# Patient Record
Sex: MALE | Race: WHITE | NOT HISPANIC OR LATINO | Employment: FULL TIME | URBAN - METROPOLITAN AREA
[De-identification: names, ages, dates, MRNs, and addresses within clinical notes are randomized per-mention and may not be internally consistent; named-entity substitution may affect disease eponyms.]

---

## 2017-04-21 ENCOUNTER — HOSPITAL ENCOUNTER (OUTPATIENT)
Dept: FAMILY MEDICINE CLINIC | Facility: CLINIC | Age: 52
Discharge: HOME | End: 2017-04-21
Attending: NURSE PRACTITIONER | Admitting: NURSE PRACTITIONER

## 2019-06-19 RX ORDER — TRIAMTERENE AND HYDROCHLOROTHIAZIDE 37.5; 25 MG/1; MG/1
1 CAPSULE ORAL DAILY
COMMUNITY
Start: 2016-01-22 | End: 2019-09-06 | Stop reason: SDUPTHER

## 2019-06-19 RX ORDER — DEXTROAMPHETAMINE SACCHARATE, AMPHETAMINE ASPARTATE, DEXTROAMPHETAMINE SULFATE AND AMPHETAMINE SULFATE 3.125; 3.125; 3.125; 3.125 MG/1; MG/1; MG/1; MG/1
12.5 TABLET ORAL 2 TIMES DAILY
COMMUNITY
Start: 2015-07-24 | End: 2019-06-20 | Stop reason: SDUPTHER

## 2019-06-20 ENCOUNTER — OFFICE VISIT (OUTPATIENT)
Dept: FAMILY MEDICINE CLINIC | Facility: CLINIC | Age: 54
End: 2019-06-20

## 2019-06-20 VITALS
TEMPERATURE: 97.7 F | HEIGHT: 72 IN | HEART RATE: 74 BPM | WEIGHT: 234 LBS | BODY MASS INDEX: 31.69 KG/M2 | OXYGEN SATURATION: 95 % | DIASTOLIC BLOOD PRESSURE: 82 MMHG | SYSTOLIC BLOOD PRESSURE: 126 MMHG

## 2019-06-20 DIAGNOSIS — F17.200 TOBACCO DEPENDENCE SYNDROME: ICD-10-CM

## 2019-06-20 DIAGNOSIS — I10 ESSENTIAL HYPERTENSION: ICD-10-CM

## 2019-06-20 DIAGNOSIS — F98.8 ATTENTION DEFICIT DISORDER (ADD) WITHOUT HYPERACTIVITY: Primary | ICD-10-CM

## 2019-06-20 DIAGNOSIS — E78.2 MIXED HYPERLIPIDEMIA: ICD-10-CM

## 2019-06-20 DIAGNOSIS — D75.1 POLYCYTHEMIA: ICD-10-CM

## 2019-06-20 DIAGNOSIS — Z00.00 PREVENTATIVE HEALTH CARE: ICD-10-CM

## 2019-06-20 DIAGNOSIS — E66.9 OBESITY WITH BODY MASS INDEX 30 OR GREATER: ICD-10-CM

## 2019-06-20 DIAGNOSIS — Z12.5 ENCOUNTER FOR SCREENING FOR MALIGNANT NEOPLASM OF PROSTATE: ICD-10-CM

## 2019-06-20 PROBLEM — M25.512 SHOULDER PAIN, LEFT: Status: ACTIVE | Noted: 2017-04-21

## 2019-06-20 PROCEDURE — 99214 OFFICE O/P EST MOD 30 MIN: CPT | Performed by: NURSE PRACTITIONER

## 2019-06-20 RX ORDER — DEXTROAMPHETAMINE SACCHARATE, AMPHETAMINE ASPARTATE, DEXTROAMPHETAMINE SULFATE AND AMPHETAMINE SULFATE 3.125; 3.125; 3.125; 3.125 MG/1; MG/1; MG/1; MG/1
12.5 TABLET ORAL 2 TIMES DAILY
Qty: 60 TABLET | Refills: 0 | Status: SHIPPED | OUTPATIENT
Start: 2019-06-20 | End: 2019-08-06 | Stop reason: SDUPTHER

## 2019-06-20 NOTE — PATIENT INSTRUCTIONS
Fasting blood work today   schedule colonoscopy   monitor blood pressures and call the office   stop smoking

## 2019-06-20 NOTE — PROGRESS NOTES
Subjective   Kendell Mccall is a 53 y.o. male.     53-year-old obese white male smoker with history of ADHD, chronic shoulder and back pain, and hypertension who comes in today for 3-month follow-up visit and refill of Adderall.  Narcotic contract signed today.  Patient states she is doing very well with the Adderall and having no complications and he has no complaints today.  Blood pressure 126/82 heart rate 74 he denies any chest pain, dyspnea, tachycardia, dizziness, or edema  Weight is up 6 pounds up to 34  Patient has had an eye exam since her last visit but he never scheduled his colonoscopy      Blood work today  Refill Adderall         The following portions of the patient's history were reviewed and updated as appropriate: allergies, current medications, past family history, past medical history, past social history, past surgical history and problem list.    Review of Systems    Objective   Physical Exam   Constitutional: He is oriented to person, place, and time. He appears well-developed and well-nourished.   Cardiovascular: Normal rate and regular rhythm.   Pulmonary/Chest: Effort normal and breath sounds normal.   Abdominal: Soft. Bowel sounds are normal.   Musculoskeletal: Normal range of motion.   Neurological: He is alert and oriented to person, place, and time.   Skin: Skin is warm and dry.         Assessment/Plan   Kendell was seen today for adhd.    Diagnoses and all orders for this visit:    Attention deficit disorder (ADD) without hyperactivity    Obesity with body mass index 30 or greater    Tobacco dependence syndrome    Encounter for screening for malignant neoplasm of prostate    Mixed hyperlipidemia  -     Lipid Panel With LDL / HDL Ratio    Essential hypertension    Polycythemia  -     CBC (No Diff)    Preventative health care  -     Comprehensive Metabolic Panel  -     PSA Screen    Other orders  -     amphetamine-dextroamphetamine (ADDERALL) 12.5 MG tablet; Take 1 tablet by mouth 2 (Two)  Times a Day.

## 2019-06-21 LAB
ALBUMIN SERPL-MCNC: 4.5 G/DL (ref 3.5–5.5)
ALBUMIN/GLOB SERPL: 2 {RATIO} (ref 1.2–2.2)
ALP SERPL-CCNC: 97 IU/L (ref 39–117)
ALT SERPL-CCNC: 25 IU/L (ref 0–44)
AST SERPL-CCNC: 32 IU/L (ref 0–40)
BILIRUB SERPL-MCNC: 0.5 MG/DL (ref 0–1.2)
BUN SERPL-MCNC: 13 MG/DL (ref 6–24)
BUN/CREAT SERPL: 14 (ref 9–20)
CALCIUM SERPL-MCNC: 9.6 MG/DL (ref 8.7–10.2)
CHLORIDE SERPL-SCNC: 103 MMOL/L (ref 96–106)
CHOLEST SERPL-MCNC: 169 MG/DL (ref 100–199)
CO2 SERPL-SCNC: 23 MMOL/L (ref 20–29)
CREAT SERPL-MCNC: 0.96 MG/DL (ref 0.76–1.27)
ERYTHROCYTE [DISTWIDTH] IN BLOOD BY AUTOMATED COUNT: 13.1 % (ref 12.3–15.4)
GLOBULIN SER CALC-MCNC: 2.3 G/DL (ref 1.5–4.5)
GLUCOSE SERPL-MCNC: 99 MG/DL (ref 65–99)
HCT VFR BLD AUTO: 50.8 % (ref 37.5–51)
HDLC SERPL-MCNC: 36 MG/DL
HGB BLD-MCNC: 18.1 G/DL (ref 13–17.7)
LDLC SERPL CALC-MCNC: 108 MG/DL (ref 0–99)
LDLC/HDLC SERPL: 3 RATIO (ref 0–3.6)
MCH RBC QN AUTO: 31.5 PG (ref 26.6–33)
MCHC RBC AUTO-ENTMCNC: 35.6 G/DL (ref 31.5–35.7)
MCV RBC AUTO: 88 FL (ref 79–97)
PLATELET # BLD AUTO: 207 X10E3/UL (ref 150–450)
POTASSIUM SERPL-SCNC: 4.3 MMOL/L (ref 3.5–5.2)
PROT SERPL-MCNC: 6.8 G/DL (ref 6–8.5)
PSA SERPL-MCNC: 3.4 NG/ML (ref 0–4)
RBC # BLD AUTO: 5.75 X10E6/UL (ref 4.14–5.8)
SODIUM SERPL-SCNC: 142 MMOL/L (ref 134–144)
TRIGL SERPL-MCNC: 123 MG/DL (ref 0–149)
VLDLC SERPL CALC-MCNC: 25 MG/DL (ref 5–40)
WBC # BLD AUTO: 8.8 X10E3/UL (ref 3.4–10.8)

## 2019-06-24 ENCOUNTER — TELEPHONE (OUTPATIENT)
Dept: FAMILY MEDICINE CLINIC | Facility: CLINIC | Age: 54
End: 2019-06-24

## 2019-08-06 RX ORDER — DEXTROAMPHETAMINE SACCHARATE, AMPHETAMINE ASPARTATE, DEXTROAMPHETAMINE SULFATE AND AMPHETAMINE SULFATE 3.125; 3.125; 3.125; 3.125 MG/1; MG/1; MG/1; MG/1
12.5 TABLET ORAL 2 TIMES DAILY
Qty: 60 TABLET | Refills: 0 | Status: SHIPPED | OUTPATIENT
Start: 2019-08-06 | End: 2019-09-16 | Stop reason: SDUPTHER

## 2019-08-06 NOTE — TELEPHONE ENCOUNTER
TC from patient requesting refill of Adderall sent to [facility] on [facility]. He has 2 days left.

## 2019-09-06 ENCOUNTER — TELEPHONE (OUTPATIENT)
Dept: FAMILY MEDICINE CLINIC | Facility: CLINIC | Age: 54
End: 2019-09-06

## 2019-09-06 RX ORDER — TRIAMTERENE AND HYDROCHLOROTHIAZIDE 37.5; 25 MG/1; MG/1
1 CAPSULE ORAL DAILY
Qty: 90 CAPSULE | Refills: 1 | Status: SHIPPED | OUTPATIENT
Start: 2019-09-06 | End: 2020-02-25 | Stop reason: SDUPTHER

## 2019-09-06 NOTE — TELEPHONE ENCOUNTER
PATIENT NEEDING HIS ADDERRALL REFILLED PLEASE  SEND TO [facility] -ALSO NEEDS HIS BP MED SENT TO [facility] MAIL ORDER - HAS A WEEK LEFT ON BOTH

## 2019-09-16 RX ORDER — DEXTROAMPHETAMINE SACCHARATE, AMPHETAMINE ASPARTATE, DEXTROAMPHETAMINE SULFATE AND AMPHETAMINE SULFATE 3.125; 3.125; 3.125; 3.125 MG/1; MG/1; MG/1; MG/1
12.5 TABLET ORAL 2 TIMES DAILY
Qty: 60 TABLET | Refills: 0 | Status: SHIPPED | OUTPATIENT
Start: 2019-09-16 | End: 2019-09-16 | Stop reason: SDUPTHER

## 2019-09-16 RX ORDER — DEXTROAMPHETAMINE SACCHARATE, AMPHETAMINE ASPARTATE, DEXTROAMPHETAMINE SULFATE AND AMPHETAMINE SULFATE 3.125; 3.125; 3.125; 3.125 MG/1; MG/1; MG/1; MG/1
12.5 TABLET ORAL 2 TIMES DAILY
Qty: 60 TABLET | Refills: 0 | Status: SHIPPED | OUTPATIENT
Start: 2019-09-16 | End: 2019-11-12 | Stop reason: SDUPTHER

## 2019-09-16 NOTE — TELEPHONE ENCOUNTER
Pt has his 3 month appt scheduled for Thursday.  Wants to know if we can go ahead and send in his medication (Adderall).  SG

## 2019-09-19 ENCOUNTER — OFFICE VISIT (OUTPATIENT)
Dept: FAMILY MEDICINE CLINIC | Facility: CLINIC | Age: 54
End: 2019-09-19

## 2019-09-19 VITALS
DIASTOLIC BLOOD PRESSURE: 85 MMHG | BODY MASS INDEX: 31.64 KG/M2 | HEART RATE: 77 BPM | SYSTOLIC BLOOD PRESSURE: 156 MMHG | HEIGHT: 72 IN | TEMPERATURE: 97.6 F | OXYGEN SATURATION: 96 % | WEIGHT: 233.6 LBS

## 2019-09-19 DIAGNOSIS — F98.8 ATTENTION DEFICIT DISORDER (ADD) WITHOUT HYPERACTIVITY: Primary | ICD-10-CM

## 2019-09-19 DIAGNOSIS — F17.200 SMOKER: ICD-10-CM

## 2019-09-19 PROCEDURE — 99213 OFFICE O/P EST LOW 20 MIN: CPT | Performed by: NURSE PRACTITIONER

## 2019-09-19 NOTE — PROGRESS NOTES
Subjective   Kendell Mccall is a 53 y.o. male.     51-year-old obese white male smoker history ADHD, chronic lower back pain, and hypertension who comes in today for 3-month follow-up visit and refill of Adderall.  Patient denies any issues that he is feeling very well blood pressure has been good   blood pressure 156/80 heart rate 76 he denies any chest pain, dyspnea, tachycardia, dizziness edema   fasting blood work due 1st of the year         The following portions of the patient's history were reviewed and updated as appropriate: allergies, current medications, past family history, past medical history, past social history, past surgical history and problem list.    Review of Systems   Constitutional: Negative.    Respiratory: Negative.    Cardiovascular: Negative.    Gastrointestinal: Negative.    Genitourinary: Negative.    Musculoskeletal: Negative.    Skin: Negative.    Neurological: Negative.        Objective   Physical Exam   Constitutional: He is oriented to person, place, and time. He appears well-developed and well-nourished.   Cardiovascular: Normal rate and regular rhythm.   Pulmonary/Chest: Effort normal and breath sounds normal.   Abdominal: Soft. Bowel sounds are normal.   Neurological: He is alert and oriented to person, place, and time.   Skin: Skin is warm and dry.   Psychiatric: He has a normal mood and affect.         Assessment/Plan   Kendell was seen today for add.    Diagnoses and all orders for this visit:    Attention deficit disorder (ADD) without hyperactivity    Smoker

## 2019-11-12 RX ORDER — DEXTROAMPHETAMINE SACCHARATE, AMPHETAMINE ASPARTATE, DEXTROAMPHETAMINE SULFATE AND AMPHETAMINE SULFATE 3.125; 3.125; 3.125; 3.125 MG/1; MG/1; MG/1; MG/1
12.5 TABLET ORAL 2 TIMES DAILY
Qty: 60 TABLET | Refills: 0 | Status: SHIPPED | OUTPATIENT
Start: 2019-11-12 | End: 2019-12-18 | Stop reason: SDUPTHER

## 2019-12-18 DIAGNOSIS — F98.8 ATTENTION DEFICIT DISORDER (ADD) WITHOUT HYPERACTIVITY: Primary | ICD-10-CM

## 2019-12-18 NOTE — TELEPHONE ENCOUNTER
Pt needs a refill on his adderral- please send to [facility] in [place]- pt had an appt tomorrow has new job couldn't make it, will call back to reschedule

## 2019-12-18 NOTE — TELEPHONE ENCOUNTER
Pt is due for his 3 month appt.  Says he got a new job and couldn't make his appt that he had scheduled for tomorrow.  SG

## 2019-12-19 RX ORDER — DEXTROAMPHETAMINE SACCHARATE, AMPHETAMINE ASPARTATE, DEXTROAMPHETAMINE SULFATE AND AMPHETAMINE SULFATE 3.125; 3.125; 3.125; 3.125 MG/1; MG/1; MG/1; MG/1
12.5 TABLET ORAL 2 TIMES DAILY
Qty: 60 TABLET | Refills: 0 | Status: SHIPPED | OUTPATIENT
Start: 2019-12-19 | End: 2020-02-25 | Stop reason: SDUPTHER

## 2020-01-15 ENCOUNTER — OFFICE VISIT (OUTPATIENT)
Dept: FAMILY MEDICINE CLINIC | Facility: CLINIC | Age: 55
End: 2020-01-15

## 2020-01-15 VITALS
BODY MASS INDEX: 32.51 KG/M2 | WEIGHT: 240 LBS | OXYGEN SATURATION: 99 % | HEART RATE: 74 BPM | DIASTOLIC BLOOD PRESSURE: 83 MMHG | HEIGHT: 72 IN | SYSTOLIC BLOOD PRESSURE: 188 MMHG | TEMPERATURE: 97.7 F

## 2020-01-15 DIAGNOSIS — I10 ESSENTIAL HYPERTENSION: Primary | ICD-10-CM

## 2020-01-15 DIAGNOSIS — F98.8 ATTENTION DEFICIT DISORDER (ADD) WITHOUT HYPERACTIVITY: ICD-10-CM

## 2020-01-15 PROCEDURE — 99214 OFFICE O/P EST MOD 30 MIN: CPT | Performed by: NURSE PRACTITIONER

## 2020-01-15 NOTE — PATIENT INSTRUCTIONS
Fasting blood work next visit   diet exercise   monitor blood pressure and call the office   reconsider colonoscopy

## 2020-01-15 NOTE — PROGRESS NOTES
Subjective   Kendell Mccall is a 54 y.o. male.      53-year-old obese white male smoker with history ADHD Martinez chronic low back pain, and hypertension who comes in today for follow-up visit.  He states he is doing well with the ADHD medication  It helps him focus.  He has had no unusual neural side effects or chest pain.  Blood pressure elevated today 188/82 heart rate 74 with murmur per patient has not taken his blood pressure medication.  I instructed him to take his medications at home and check his blood pressures and call them to the office to make sure they are  Within normal limits.  His pressure at last visit on blood pressure medicine was 156/80  Weight is 240 which is up 7 lb   patient up-to-date on PSA and eye exam but still refuses colonoscopy     follow-up 3 months fasting for blood work   refill medications   reconsider colonoscopy       The following portions of the patient's history were reviewed and updated as appropriate: allergies, current medications, past family history, past medical history, past social history, past surgical history and problem list.    Review of Systems   Constitutional: Negative.    HENT: Negative.    Respiratory: Negative.    Cardiovascular: Negative.    Gastrointestinal: Negative.    Genitourinary: Negative.    Musculoskeletal: Negative.    Skin: Negative.    Neurological: Negative.    Psychiatric/Behavioral: Negative.        Objective   Physical Exam   Constitutional: He is oriented to person, place, and time. He appears well-developed and well-nourished.   Cardiovascular: Normal rate and regular rhythm.   Pulmonary/Chest: Effort normal.   Abdominal: Soft. Bowel sounds are normal.   Musculoskeletal: Normal range of motion.   Neurological: He is alert and oriented to person, place, and time.   Skin: Skin is warm and dry.   Psychiatric: He has a normal mood and affect.         Assessment/Plan   Kendell was seen today for add.    Diagnoses and all orders for this  visit:    Essential hypertension    Attention deficit disorder (ADD) without hyperactivity    BMI 32.0-32.9,adult

## 2020-02-25 DIAGNOSIS — F98.8 ATTENTION DEFICIT DISORDER (ADD) WITHOUT HYPERACTIVITY: ICD-10-CM

## 2020-02-25 RX ORDER — DEXTROAMPHETAMINE SACCHARATE, AMPHETAMINE ASPARTATE, DEXTROAMPHETAMINE SULFATE AND AMPHETAMINE SULFATE 3.125; 3.125; 3.125; 3.125 MG/1; MG/1; MG/1; MG/1
12.5 TABLET ORAL 2 TIMES DAILY
Qty: 60 TABLET | Refills: 0 | Status: SHIPPED | OUTPATIENT
Start: 2020-02-25 | End: 2020-05-11 | Stop reason: SDUPTHER

## 2020-02-25 RX ORDER — TRIAMTERENE AND HYDROCHLOROTHIAZIDE 37.5; 25 MG/1; MG/1
1 CAPSULE ORAL DAILY
Qty: 90 CAPSULE | Refills: 1 | Status: SHIPPED | OUTPATIENT
Start: 2020-02-25 | End: 2020-05-11 | Stop reason: SDUPTHER

## 2020-05-11 DIAGNOSIS — F98.8 ATTENTION DEFICIT DISORDER (ADD) WITHOUT HYPERACTIVITY: ICD-10-CM

## 2020-05-11 RX ORDER — DEXTROAMPHETAMINE SACCHARATE, AMPHETAMINE ASPARTATE, DEXTROAMPHETAMINE SULFATE AND AMPHETAMINE SULFATE 3.125; 3.125; 3.125; 3.125 MG/1; MG/1; MG/1; MG/1
12.5 TABLET ORAL 2 TIMES DAILY
Qty: 60 TABLET | Refills: 0 | Status: SHIPPED | OUTPATIENT
Start: 2020-05-11 | End: 2020-08-31 | Stop reason: SDUPTHER

## 2020-05-11 RX ORDER — TRIAMTERENE AND HYDROCHLOROTHIAZIDE 37.5; 25 MG/1; MG/1
1 CAPSULE ORAL DAILY
Qty: 90 CAPSULE | Refills: 1 | Status: SHIPPED | OUTPATIENT
Start: 2020-05-11 | End: 2020-08-31 | Stop reason: SDUPTHER

## 2020-05-11 NOTE — TELEPHONE ENCOUNTER
Date of last refill: Adderall 02/25/2020       Dyazide 02/25/2020  Is there an Inspect on file: please run    Last appt date  :9/19/2019       Next appt date: none      Additional information:

## 2020-08-17 DIAGNOSIS — F98.8 ATTENTION DEFICIT DISORDER (ADD) WITHOUT HYPERACTIVITY: ICD-10-CM

## 2020-08-17 RX ORDER — DEXTROAMPHETAMINE SACCHARATE, AMPHETAMINE ASPARTATE, DEXTROAMPHETAMINE SULFATE AND AMPHETAMINE SULFATE 3.125; 3.125; 3.125; 3.125 MG/1; MG/1; MG/1; MG/1
12.5 TABLET ORAL 2 TIMES DAILY
Qty: 60 TABLET | Refills: 0 | OUTPATIENT
Start: 2020-08-17

## 2020-08-31 ENCOUNTER — OFFICE VISIT (OUTPATIENT)
Dept: FAMILY MEDICINE CLINIC | Facility: CLINIC | Age: 55
End: 2020-08-31

## 2020-08-31 VITALS
OXYGEN SATURATION: 98 % | BODY MASS INDEX: 33.16 KG/M2 | TEMPERATURE: 97.3 F | WEIGHT: 244.8 LBS | HEART RATE: 72 BPM | SYSTOLIC BLOOD PRESSURE: 133 MMHG | DIASTOLIC BLOOD PRESSURE: 87 MMHG | RESPIRATION RATE: 18 BRPM | HEIGHT: 72 IN

## 2020-08-31 DIAGNOSIS — F17.200 TOBACCO DEPENDENCE SYNDROME: ICD-10-CM

## 2020-08-31 DIAGNOSIS — F98.8 ATTENTION DEFICIT DISORDER (ADD) WITHOUT HYPERACTIVITY: ICD-10-CM

## 2020-08-31 DIAGNOSIS — I10 ESSENTIAL HYPERTENSION: Primary | ICD-10-CM

## 2020-08-31 PROCEDURE — 99214 OFFICE O/P EST MOD 30 MIN: CPT | Performed by: NURSE PRACTITIONER

## 2020-08-31 RX ORDER — TRAZODONE HYDROCHLORIDE 50 MG/1
50 TABLET ORAL NIGHTLY
Qty: 60 TABLET | Refills: 1 | Status: SHIPPED | OUTPATIENT
Start: 2020-08-31 | End: 2020-10-24

## 2020-08-31 RX ORDER — TRIAMTERENE AND HYDROCHLOROTHIAZIDE 37.5; 25 MG/1; MG/1
1 CAPSULE ORAL DAILY
Qty: 90 CAPSULE | Refills: 1 | Status: SHIPPED | OUTPATIENT
Start: 2020-08-31 | End: 2021-03-19 | Stop reason: SDUPTHER

## 2020-08-31 RX ORDER — DEXTROAMPHETAMINE SACCHARATE, AMPHETAMINE ASPARTATE, DEXTROAMPHETAMINE SULFATE AND AMPHETAMINE SULFATE 3.125; 3.125; 3.125; 3.125 MG/1; MG/1; MG/1; MG/1
12.5 TABLET ORAL 2 TIMES DAILY
Qty: 60 TABLET | Refills: 0 | Status: SHIPPED | OUTPATIENT
Start: 2020-08-31 | End: 2020-11-05 | Stop reason: SDUPTHER

## 2020-08-31 NOTE — PROGRESS NOTES
"    Kendell Mccall is a 54 y.o. male.     54-year-old obese white male smoker with history ADHD, chronic low back pain and hypertension who comes in for follow-up visit today.  Patient states he started a new job and will have insurance next month.  I am going to check his kidney liver function today due to the ADHD medication and he will call and get the rest of his fasting blood work done  He is having trouble sleeping and I am placing him on some trazodone to help him sleep  Blood pressure little elevated 132/86 heart rate 72 he denies any chest pain, dyspnea, tachycardia or dizziness.  Patient is going to monitor blood pressure at home and let me know if it remains within normal limits or not  Weight is up 5 pounds at 245 and we discussed diet weight loss  Patient never got his colonoscopy schedule due to not having insurance    CMP today  Fasting blood work in near future  Trazodone 50 mg 1-3 nightly as needed  Diet and exercise as discussed  Monitor blood pressure at home and call office  Follow-up 6 months         The following portions of the patient's history were reviewed and updated as appropriate: allergies, current medications, past family history, past medical history, past social history, past surgical history and problem list.    Vitals:    08/31/20 1301   BP: 133/87   BP Location: Right arm   Patient Position: Sitting   Cuff Size: Large Adult   Pulse: 72   Resp: 18   Temp: 97.3 °F (36.3 °C)   TempSrc: Temporal   SpO2: 98%   Weight: 111 kg (244 lb 12.8 oz)   Height: 182.9 cm (72.01\")     Body mass index is 33.19 kg/m².    Past Medical History:   Diagnosis Date   • ADHD (attention deficit hyperactivity disorder)    • Hypertension      Past Surgical History:   Procedure Laterality Date   • EYE SURGERY     • HERNIA REPAIR       Family History   Problem Relation Age of Onset   • Heart disease Mother    • Diabetes Father    • Lung cancer Father        There is no immunization history on file for this " patient.    Office Visit on 06/20/2019   Component Date Value Ref Range Status   • WBC 06/20/2019 8.8  3.4 - 10.8 x10E3/uL Final   • RBC 06/20/2019 5.75  4.14 - 5.80 x10E6/uL Final   • Hemoglobin 06/20/2019 18.1* 13.0 - 17.7 g/dL Final   • Hematocrit 06/20/2019 50.8  37.5 - 51.0 % Final   • MCV 06/20/2019 88  79 - 97 fL Final   • MCH 06/20/2019 31.5  26.6 - 33.0 pg Final   • MCHC 06/20/2019 35.6  31.5 - 35.7 g/dL Final   • RDW 06/20/2019 13.1  12.3 - 15.4 % Final   • Platelets 06/20/2019 207  150 - 450 x10E3/uL Final   • Glucose 06/20/2019 99  65 - 99 mg/dL Final    Comment: Specimen received in contact with cells. No visible hemolysis  present. However GLUC may be decreased and K increased. Clinical  correlation indicated.     • BUN 06/20/2019 13  6 - 24 mg/dL Final   • Creatinine 06/20/2019 0.96  0.76 - 1.27 mg/dL Final   • eGFR Non  Am 06/20/2019 91  >59 mL/min/1.73 Final   • eGFR African Am 06/20/2019 105  >59 mL/min/1.73 Final   • BUN/Creatinine Ratio 06/20/2019 14  9 - 20 Final   • Sodium 06/20/2019 142  134 - 144 mmol/L Final   • Potassium 06/20/2019 4.3  3.5 - 5.2 mmol/L Final    Comment: Specimen received in contact with cells. No visible hemolysis  present. However GLUC may be decreased and K increased. Clinical  correlation indicated.     • Chloride 06/20/2019 103  96 - 106 mmol/L Final   • Total CO2 06/20/2019 23  20 - 29 mmol/L Final   • Calcium 06/20/2019 9.6  8.7 - 10.2 mg/dL Final   • Total Protein 06/20/2019 6.8  6.0 - 8.5 g/dL Final   • Albumin 06/20/2019 4.5  3.5 - 5.5 g/dL Final   • Globulin 06/20/2019 2.3  1.5 - 4.5 g/dL Final   • A/G Ratio 06/20/2019 2.0  1.2 - 2.2 Final   • Total Bilirubin 06/20/2019 0.5  0.0 - 1.2 mg/dL Final   • Alkaline Phosphatase 06/20/2019 97  39 - 117 IU/L Final   • AST (SGOT) 06/20/2019 32  0 - 40 IU/L Final   • ALT (SGPT) 06/20/2019 25  0 - 44 IU/L Final   • Total Cholesterol 06/20/2019 169  100 - 199 mg/dL Final   • Triglycerides 06/20/2019 123  0 - 149 mg/dL  Final   • HDL Cholesterol 06/20/2019 36* >39 mg/dL Final   • VLDL Cholesterol 06/20/2019 25  5 - 40 mg/dL Final   • LDL Cholesterol  06/20/2019 108* 0 - 99 mg/dL Final   • LDL/HDL Ratio 06/20/2019 3.0  0.0 - 3.6 ratio Final    Comment:                                     LDL/HDL Ratio                                              Men  Women                                1/2 Avg.Risk  1.0    1.5                                    Avg.Risk  3.6    3.2                                 2X Avg.Risk  6.2    5.0                                 3X Avg.Risk  8.0    6.1     • PSA 06/20/2019 3.4  0.0 - 4.0 ng/mL Final    Comment: Roche ECLIA methodology.  According to the American Urological Association, Serum PSA should  decrease and remain at undetectable levels after radical  prostatectomy. The AUA defines biochemical recurrence as an initial  PSA value 0.2 ng/mL or greater followed by a subsequent confirmatory  PSA value 0.2 ng/mL or greater.  Values obtained with different assay methods or kits cannot be used  interchangeably. Results cannot be interpreted as absolute evidence  of the presence or absence of malignant disease.           Review of Systems   Constitutional: Negative.    HENT: Negative.    Respiratory: Negative.    Cardiovascular: Negative.    Gastrointestinal: Negative.    Genitourinary: Negative.    Musculoskeletal: Negative.    Skin: Negative.    Neurological: Negative.    Psychiatric/Behavioral: Negative.        Objective   Physical Exam   Constitutional: He is oriented to person, place, and time. He appears well-developed and well-nourished.   Cardiovascular: Normal rate and regular rhythm.   Pulmonary/Chest: Effort normal and breath sounds normal.   Musculoskeletal: Normal range of motion.   Neurological: He is alert and oriented to person, place, and time.   Skin: Skin is warm and dry.   Psychiatric: He has a normal mood and affect.       Procedures    Assessment/Plan   Kendell was seen today for med  refill and insomnia.    Diagnoses and all orders for this visit:    Essential hypertension    Attention deficit disorder (ADD) without hyperactivity  -     amphetamine-dextroamphetamine (Adderall) 12.5 MG tablet; Take 1 tablet by mouth 2 (Two) Times a Day.  -     Comprehensive Metabolic Panel    Tobacco dependence syndrome    BMI 33.0-33.9,adult    Other orders  -     traZODone (DESYREL) 50 MG tablet; Take 1 tablet by mouth Every Night.  -     triamterene-hydrochlorothiazide (DYAZIDE) 37.5-25 MG per capsule; Take 1 capsule by mouth Daily.          Current Outpatient Medications:   •  amphetamine-dextroamphetamine (Adderall) 12.5 MG tablet, Take 1 tablet by mouth 2 (Two) Times a Day., Disp: 60 tablet, Rfl: 0  •  triamterene-hydrochlorothiazide (DYAZIDE) 37.5-25 MG per capsule, Take 1 capsule by mouth Daily., Disp: 90 capsule, Rfl: 1  •  traZODone (DESYREL) 50 MG tablet, Take 1 tablet by mouth Every Night., Disp: 60 tablet, Rfl: 1

## 2020-09-01 LAB
ALBUMIN SERPL-MCNC: 4.9 G/DL (ref 3.8–4.9)
ALBUMIN/GLOB SERPL: 2.5 {RATIO} (ref 1.2–2.2)
ALP SERPL-CCNC: 105 IU/L (ref 39–117)
ALT SERPL-CCNC: 22 IU/L (ref 0–44)
AST SERPL-CCNC: 24 IU/L (ref 0–40)
BILIRUB SERPL-MCNC: 0.4 MG/DL (ref 0–1.2)
BUN SERPL-MCNC: 17 MG/DL (ref 6–24)
BUN/CREAT SERPL: 18 (ref 9–20)
CALCIUM SERPL-MCNC: 9.6 MG/DL (ref 8.7–10.2)
CHLORIDE SERPL-SCNC: 99 MMOL/L (ref 96–106)
CO2 SERPL-SCNC: 27 MMOL/L (ref 20–29)
CREAT SERPL-MCNC: 0.93 MG/DL (ref 0.76–1.27)
GLOBULIN SER CALC-MCNC: 2 G/DL (ref 1.5–4.5)
GLUCOSE SERPL-MCNC: 72 MG/DL (ref 65–99)
POTASSIUM SERPL-SCNC: 4.4 MMOL/L (ref 3.5–5.2)
PROT SERPL-MCNC: 6.9 G/DL (ref 6–8.5)
SODIUM SERPL-SCNC: 139 MMOL/L (ref 134–144)

## 2020-10-24 RX ORDER — TRAZODONE HYDROCHLORIDE 50 MG/1
TABLET ORAL
Qty: 60 TABLET | Refills: 1 | Status: SHIPPED | OUTPATIENT
Start: 2020-10-24 | End: 2020-11-23

## 2020-11-05 DIAGNOSIS — F98.8 ATTENTION DEFICIT DISORDER (ADD) WITHOUT HYPERACTIVITY: ICD-10-CM

## 2020-11-05 RX ORDER — DEXTROAMPHETAMINE SACCHARATE, AMPHETAMINE ASPARTATE, DEXTROAMPHETAMINE SULFATE AND AMPHETAMINE SULFATE 3.125; 3.125; 3.125; 3.125 MG/1; MG/1; MG/1; MG/1
12.5 TABLET ORAL 2 TIMES DAILY
Qty: 60 TABLET | Refills: 0 | Status: SHIPPED | OUTPATIENT
Start: 2020-11-05 | End: 2020-12-16 | Stop reason: SDUPTHER

## 2020-11-23 ENCOUNTER — OFFICE VISIT (OUTPATIENT)
Dept: FAMILY MEDICINE CLINIC | Facility: CLINIC | Age: 55
End: 2020-11-23

## 2020-11-23 VITALS
HEART RATE: 77 BPM | WEIGHT: 242.8 LBS | OXYGEN SATURATION: 98 % | BODY MASS INDEX: 32.89 KG/M2 | DIASTOLIC BLOOD PRESSURE: 84 MMHG | TEMPERATURE: 97.1 F | SYSTOLIC BLOOD PRESSURE: 132 MMHG | HEIGHT: 72 IN

## 2020-11-23 DIAGNOSIS — E78.2 MIXED HYPERLIPIDEMIA: Primary | ICD-10-CM

## 2020-11-23 DIAGNOSIS — Z23 NEED FOR IMMUNIZATION AGAINST INFLUENZA: ICD-10-CM

## 2020-11-23 DIAGNOSIS — I10 ESSENTIAL HYPERTENSION: ICD-10-CM

## 2020-11-23 DIAGNOSIS — Z12.5 SCREENING PSA (PROSTATE SPECIFIC ANTIGEN): ICD-10-CM

## 2020-11-23 DIAGNOSIS — F17.200 SMOKER: ICD-10-CM

## 2020-11-23 DIAGNOSIS — Z00.00 PREVENTATIVE HEALTH CARE: ICD-10-CM

## 2020-11-23 PROCEDURE — 99214 OFFICE O/P EST MOD 30 MIN: CPT | Performed by: NURSE PRACTITIONER

## 2020-11-23 RX ORDER — AMLODIPINE BESYLATE 2.5 MG/1
2.5 TABLET ORAL
Qty: 30 TABLET | Refills: 2 | Status: SHIPPED | OUTPATIENT
Start: 2020-11-23 | End: 2020-11-23 | Stop reason: ALTCHOICE

## 2020-11-23 RX ORDER — CYCLOBENZAPRINE HCL 10 MG
10 TABLET ORAL NIGHTLY PRN
Qty: 30 TABLET | Refills: 2 | Status: SHIPPED | OUTPATIENT
Start: 2020-11-23 | End: 2021-08-10

## 2020-11-23 NOTE — PATIENT INSTRUCTIONS
Fasting blood work today  Take follow-up 3 months  amlodipine as directed monitor blood pressures and call the office  Schedule eye exam  Diet exercise as discussed during visit

## 2020-11-23 NOTE — PROGRESS NOTES
"    Kendell Mccall is a 55 y.o. male.     55-year-old obese white male smoker with history ADHD, chronic low back pain and hypertension who comes in today for 3-month follow-up  Patient states he is doing very well on his ADHD medication has had no issues.  Blood pressure 156/88 heart rate 76 with small murmur.  I am adding amlodipine 2.5 at bedtime to 5 to bring his diastolic pressure down a little.  He denies any chest pain, dyspnea, tachycardia or dizziness  Patient still having issues with insomnia.  On last visit I placed him on trazodone but it caused him to have a very dry mouth so he stopped taking it  We will go to try 10 of melatonin and 10 mg of Flexeril at bedtime to see if he can sleep  Patient's last hemoglobin was 18.1 we will be rechecking  Weight is 243 with a BMI of 33 we discussed diet weight loss     fasting blood work today  Amlodipine 2.5 mg nightly  Monitor blood pressure and call office  Schedule eye exam  Diet and exercise as discussed  Follow-up 3 months           The following portions of the patient's history were reviewed and updated as appropriate: allergies, current medications, past family history, past medical history, past social history, past surgical history and problem list.    Vitals:    11/23/20 0858 11/23/20 0901   BP: 156/89 132/84   BP Location: Right arm Right arm   Patient Position: Sitting Sitting   Cuff Size: Adult Adult   Pulse: 77    Temp: 97.1 °F (36.2 °C)    TempSrc: Infrared    SpO2: 98%    Weight: 110 kg (242 lb 12.8 oz)    Height: 182.9 cm (72.01\")      Body mass index is 32.92 kg/m².    Past Medical History:   Diagnosis Date   • ADHD (attention deficit hyperactivity disorder)    • Hypertension      Past Surgical History:   Procedure Laterality Date   • EYE SURGERY     • HERNIA REPAIR       Family History   Problem Relation Age of Onset   • Heart disease Mother    • Diabetes Father    • Lung cancer Father        There is no immunization history on file for this " patient.    Office Visit on 08/31/2020   Component Date Value Ref Range Status   • Glucose 08/31/2020 72  65 - 99 mg/dL Final   • BUN 08/31/2020 17  6 - 24 mg/dL Final   • Creatinine 08/31/2020 0.93  0.76 - 1.27 mg/dL Final   • eGFR Non African Am 08/31/2020 93  >59 mL/min/1.73 Final   • eGFR African Am 08/31/2020 108  >59 mL/min/1.73 Final   • BUN/Creatinine Ratio 08/31/2020 18  9 - 20 Final   • Sodium 08/31/2020 139  134 - 144 mmol/L Final   • Potassium 08/31/2020 4.4  3.5 - 5.2 mmol/L Final   • Chloride 08/31/2020 99  96 - 106 mmol/L Final   • Total CO2 08/31/2020 27  20 - 29 mmol/L Final   • Calcium 08/31/2020 9.6  8.7 - 10.2 mg/dL Final   • Total Protein 08/31/2020 6.9  6.0 - 8.5 g/dL Final   • Albumin 08/31/2020 4.9  3.8 - 4.9 g/dL Final   • Globulin 08/31/2020 2.0  1.5 - 4.5 g/dL Final   • A/G Ratio 08/31/2020 2.5* 1.2 - 2.2 Final   • Total Bilirubin 08/31/2020 0.4  0.0 - 1.2 mg/dL Final   • Alkaline Phosphatase 08/31/2020 105  39 - 117 IU/L Final   • AST (SGOT) 08/31/2020 24  0 - 40 IU/L Final   • ALT (SGPT) 08/31/2020 22  0 - 44 IU/L Final         Review of Systems   Constitutional: Negative.    HENT: Negative.    Respiratory: Negative.    Cardiovascular: Negative.    Gastrointestinal: Negative.    Genitourinary: Negative.    Musculoskeletal: Negative.    Skin: Negative.    Neurological: Negative.    Psychiatric/Behavioral: Negative.        Objective   Physical Exam  Constitutional:       Appearance: Normal appearance.   HENT:      Head: Normocephalic.   Neck:      Musculoskeletal: Normal range of motion.   Cardiovascular:      Rate and Rhythm: Normal rate and regular rhythm.      Pulses: Normal pulses.      Heart sounds: Murmur present.   Pulmonary:      Effort: Pulmonary effort is normal.      Breath sounds: Normal breath sounds.   Abdominal:      General: Bowel sounds are normal.   Musculoskeletal: Normal range of motion.   Skin:     General: Skin is warm and dry.   Neurological:      General: No focal  deficit present.      Mental Status: He is alert and oriented to person, place, and time.   Psychiatric:         Mood and Affect: Mood normal.         Behavior: Behavior normal.         Procedures    Assessment/Plan   Problems Addressed this Visit        Cardiovascular and Mediastinum    Hyperlipidemia - Primary    Relevant Orders    Lipid Panel With LDL / HDL Ratio    Hypertension       Other    Smoker      Other Visit Diagnoses     Preventative health care        Relevant Orders    CBC & Differential    Comprehensive Metabolic Panel    Screening PSA (prostate specific antigen)        Relevant Orders    PSA Screen    Need for immunization against influenza        BMI 33.0-33.9,adult          Diagnoses       Codes Comments    Mixed hyperlipidemia    -  Primary ICD-10-CM: E78.2  ICD-9-CM: 272.2     Preventative health care     ICD-10-CM: Z00.00  ICD-9-CM: V70.0     Screening PSA (prostate specific antigen)     ICD-10-CM: Z12.5  ICD-9-CM: V76.44     Need for immunization against influenza     ICD-10-CM: Z23  ICD-9-CM: V04.81     Essential hypertension     ICD-10-CM: I10  ICD-9-CM: 401.9     Smoker     ICD-10-CM: F17.200  ICD-9-CM: 305.1     BMI 33.0-33.9,adult     ICD-10-CM: Z68.33  ICD-9-CM: V85.33             Current Outpatient Medications:   •  amphetamine-dextroamphetamine (Adderall) 12.5 MG tablet, Take 1 tablet by mouth 2 (Two) Times a Day., Disp: 60 tablet, Rfl: 0  •  triamterene-hydrochlorothiazide (DYAZIDE) 37.5-25 MG per capsule, Take 1 capsule by mouth Daily., Disp: 90 capsule, Rfl: 1  •  cyclobenzaprine (FLEXERIL) 10 MG tablet, Take 1 tablet by mouth At Night As Needed for Muscle Spasms., Disp: 30 tablet, Rfl: 2

## 2020-11-24 LAB
ALBUMIN SERPL-MCNC: 4.4 G/DL (ref 3.8–4.9)
ALBUMIN/GLOB SERPL: 2.2 {RATIO} (ref 1.2–2.2)
ALP SERPL-CCNC: 102 IU/L (ref 39–117)
ALT SERPL-CCNC: 23 IU/L (ref 0–44)
AST SERPL-CCNC: 32 IU/L (ref 0–40)
BASOPHILS # BLD AUTO: 0 X10E3/UL (ref 0–0.2)
BASOPHILS NFR BLD AUTO: 0 %
BILIRUB SERPL-MCNC: 0.6 MG/DL (ref 0–1.2)
BUN SERPL-MCNC: 12 MG/DL (ref 6–24)
BUN/CREAT SERPL: 14 (ref 9–20)
CALCIUM SERPL-MCNC: 9.8 MG/DL (ref 8.7–10.2)
CHLORIDE SERPL-SCNC: 102 MMOL/L (ref 96–106)
CHOLEST SERPL-MCNC: 162 MG/DL (ref 100–199)
CO2 SERPL-SCNC: 28 MMOL/L (ref 20–29)
CREAT SERPL-MCNC: 0.87 MG/DL (ref 0.76–1.27)
EOSINOPHIL # BLD AUTO: 0.3 X10E3/UL (ref 0–0.4)
EOSINOPHIL NFR BLD AUTO: 3 %
ERYTHROCYTE [DISTWIDTH] IN BLOOD BY AUTOMATED COUNT: 12.3 % (ref 11.6–15.4)
GLOBULIN SER CALC-MCNC: 2 G/DL (ref 1.5–4.5)
GLUCOSE SERPL-MCNC: 91 MG/DL (ref 65–99)
HCT VFR BLD AUTO: 52.4 % (ref 37.5–51)
HDLC SERPL-MCNC: 34 MG/DL
HGB BLD-MCNC: 17.9 G/DL (ref 13–17.7)
IMM GRANULOCYTES # BLD AUTO: 0 X10E3/UL (ref 0–0.1)
IMM GRANULOCYTES NFR BLD AUTO: 0 %
LDLC SERPL CALC-MCNC: 100 MG/DL (ref 0–99)
LDLC/HDLC SERPL: 2.9 RATIO (ref 0–3.6)
LYMPHOCYTES # BLD AUTO: 1.8 X10E3/UL (ref 0.7–3.1)
LYMPHOCYTES NFR BLD AUTO: 21 %
MCH RBC QN AUTO: 30.2 PG (ref 26.6–33)
MCHC RBC AUTO-ENTMCNC: 34.2 G/DL (ref 31.5–35.7)
MCV RBC AUTO: 89 FL (ref 79–97)
MONOCYTES # BLD AUTO: 0.6 X10E3/UL (ref 0.1–0.9)
MONOCYTES NFR BLD AUTO: 7 %
NEUTROPHILS # BLD AUTO: 5.9 X10E3/UL (ref 1.4–7)
NEUTROPHILS NFR BLD AUTO: 69 %
PLATELET # BLD AUTO: 210 X10E3/UL (ref 150–450)
POTASSIUM SERPL-SCNC: 4.3 MMOL/L (ref 3.5–5.2)
PROT SERPL-MCNC: 6.4 G/DL (ref 6–8.5)
PSA SERPL-MCNC: 3.9 NG/ML (ref 0–4)
RBC # BLD AUTO: 5.92 X10E6/UL (ref 4.14–5.8)
SODIUM SERPL-SCNC: 141 MMOL/L (ref 134–144)
TRIGL SERPL-MCNC: 158 MG/DL (ref 0–149)
VLDLC SERPL CALC-MCNC: 28 MG/DL (ref 5–40)
WBC # BLD AUTO: 8.6 X10E3/UL (ref 3.4–10.8)

## 2020-12-16 DIAGNOSIS — F98.8 ATTENTION DEFICIT DISORDER (ADD) WITHOUT HYPERACTIVITY: ICD-10-CM

## 2020-12-16 RX ORDER — DEXTROAMPHETAMINE SACCHARATE, AMPHETAMINE ASPARTATE, DEXTROAMPHETAMINE SULFATE AND AMPHETAMINE SULFATE 3.125; 3.125; 3.125; 3.125 MG/1; MG/1; MG/1; MG/1
12.5 TABLET ORAL 2 TIMES DAILY
Qty: 60 TABLET | Refills: 0 | Status: SHIPPED | OUTPATIENT
Start: 2020-12-16 | End: 2021-02-01 | Stop reason: SDUPTHER

## 2021-02-01 DIAGNOSIS — F98.8 ATTENTION DEFICIT DISORDER (ADD) WITHOUT HYPERACTIVITY: ICD-10-CM

## 2021-02-01 RX ORDER — DEXTROAMPHETAMINE SACCHARATE, AMPHETAMINE ASPARTATE, DEXTROAMPHETAMINE SULFATE AND AMPHETAMINE SULFATE 3.125; 3.125; 3.125; 3.125 MG/1; MG/1; MG/1; MG/1
12.5 TABLET ORAL 2 TIMES DAILY
Qty: 60 TABLET | Refills: 0 | Status: SHIPPED | OUTPATIENT
Start: 2021-02-01 | End: 2021-03-19 | Stop reason: SDUPTHER

## 2021-03-19 ENCOUNTER — TELEPHONE (OUTPATIENT)
Dept: FAMILY MEDICINE CLINIC | Facility: CLINIC | Age: 56
End: 2021-03-19

## 2021-03-19 DIAGNOSIS — F98.8 ATTENTION DEFICIT DISORDER (ADD) WITHOUT HYPERACTIVITY: ICD-10-CM

## 2021-03-19 NOTE — TELEPHONE ENCOUNTER
Caller: Kendell Mccall    Relationship: Self    Best call back number: [phone]    Medication needed:   Requested Prescriptions     Pending Prescriptions Disp Refills   • amphetamine-dextroamphetamine (Adderall) 12.5 MG tablet 60 tablet 0     Sig: Take 1 tablet by mouth 2 (Two) Times a Day.   • triamterene-hydrochlorothiazide (DYAZIDE) 37.5-25 MG per capsule 90 capsule 1     Sig: Take 1 capsule by mouth Daily.       When do you need the refill by: ASAP    What additional details did the patient provide when requesting the medication: Patient has 5 days     Does the patient have less than a 3 day supply:  [] Yes  [x] No    What is the patient's preferred pharmacy: [id number] - [place] - [address] - [phone]  - [phone] FX

## 2021-03-21 RX ORDER — TRIAMTERENE AND HYDROCHLOROTHIAZIDE 37.5; 25 MG/1; MG/1
1 CAPSULE ORAL DAILY
Qty: 90 CAPSULE | Refills: 1 | Status: SHIPPED | OUTPATIENT
Start: 2021-03-21 | End: 2021-03-22 | Stop reason: SDUPTHER

## 2021-03-21 RX ORDER — DEXTROAMPHETAMINE SACCHARATE, AMPHETAMINE ASPARTATE, DEXTROAMPHETAMINE SULFATE AND AMPHETAMINE SULFATE 3.125; 3.125; 3.125; 3.125 MG/1; MG/1; MG/1; MG/1
12.5 TABLET ORAL 2 TIMES DAILY
Qty: 30 TABLET | Refills: 0 | Status: SHIPPED | OUTPATIENT
Start: 2021-03-21 | End: 2021-03-22 | Stop reason: SDUPTHER

## 2021-03-22 DIAGNOSIS — F98.8 ATTENTION DEFICIT DISORDER (ADD) WITHOUT HYPERACTIVITY: ICD-10-CM

## 2021-03-22 RX ORDER — SILDENAFIL 100 MG/1
100 TABLET, FILM COATED ORAL DAILY PRN
Qty: 15 TABLET | Refills: 0 | Status: SHIPPED | OUTPATIENT
Start: 2021-03-22

## 2021-03-22 RX ORDER — DEXTROAMPHETAMINE SACCHARATE, AMPHETAMINE ASPARTATE, DEXTROAMPHETAMINE SULFATE AND AMPHETAMINE SULFATE 3.125; 3.125; 3.125; 3.125 MG/1; MG/1; MG/1; MG/1
12.5 TABLET ORAL 2 TIMES DAILY
Qty: 30 TABLET | Refills: 0 | Status: SHIPPED | OUTPATIENT
Start: 2021-03-22 | End: 2021-03-29 | Stop reason: SDUPTHER

## 2021-03-22 RX ORDER — TRIAMTERENE AND HYDROCHLOROTHIAZIDE 37.5; 25 MG/1; MG/1
1 CAPSULE ORAL DAILY
Qty: 90 CAPSULE | Refills: 1 | Status: SHIPPED | OUTPATIENT
Start: 2021-03-22 | End: 2021-12-19

## 2021-03-22 NOTE — TELEPHONE ENCOUNTER
Pt made appt for Monday. He also wants viagra 100mg. Hes been on it before, said you prescribed it for him. Needs it this week.

## 2021-03-29 ENCOUNTER — OFFICE VISIT (OUTPATIENT)
Dept: FAMILY MEDICINE CLINIC | Facility: CLINIC | Age: 56
End: 2021-03-29

## 2021-03-29 VITALS
TEMPERATURE: 97.1 F | WEIGHT: 246.8 LBS | BODY MASS INDEX: 33.43 KG/M2 | SYSTOLIC BLOOD PRESSURE: 125 MMHG | OXYGEN SATURATION: 97 % | DIASTOLIC BLOOD PRESSURE: 83 MMHG | HEART RATE: 73 BPM | HEIGHT: 72 IN

## 2021-03-29 DIAGNOSIS — D75.1 POLYCYTHEMIA: Primary | ICD-10-CM

## 2021-03-29 DIAGNOSIS — E66.09 CLASS 1 OBESITY DUE TO EXCESS CALORIES WITHOUT SERIOUS COMORBIDITY WITH BODY MASS INDEX (BMI) OF 33.0 TO 33.9 IN ADULT: ICD-10-CM

## 2021-03-29 DIAGNOSIS — F98.8 ATTENTION DEFICIT DISORDER (ADD) WITHOUT HYPERACTIVITY: ICD-10-CM

## 2021-03-29 PROBLEM — E66.811 CLASS 1 OBESITY DUE TO EXCESS CALORIES WITHOUT SERIOUS COMORBIDITY WITH BODY MASS INDEX (BMI) OF 33.0 TO 33.9 IN ADULT: Status: ACTIVE | Noted: 2021-03-29

## 2021-03-29 PROCEDURE — 99213 OFFICE O/P EST LOW 20 MIN: CPT | Performed by: NURSE PRACTITIONER

## 2021-03-29 RX ORDER — DEXTROAMPHETAMINE SACCHARATE, AMPHETAMINE ASPARTATE, DEXTROAMPHETAMINE SULFATE AND AMPHETAMINE SULFATE 3.125; 3.125; 3.125; 3.125 MG/1; MG/1; MG/1; MG/1
12.5 TABLET ORAL 2 TIMES DAILY
Qty: 30 TABLET | Refills: 0 | Status: SHIPPED | OUTPATIENT
Start: 2021-03-29 | End: 2021-05-27 | Stop reason: SDUPTHER

## 2021-03-29 NOTE — PROGRESS NOTES
"    Kendell Mccall is a 55 y.o. male.     55-year-old white male smoker with history of ADHD, chronic low back pain and hypertension who comes in today for 3-month follow-up  Blood pressure 124/82 heart rate 72 he denies any chest pain, dyspnea, tachycardia or dizziness.  Patient is going to biannual blood pressure cuff and monitor pressures better at home  Patient was hemoglobin 17.9 he is a smoker we will monitor CBC  Patient strained his right knee a month ago but states is a lot better now he is wearing a brace  Weight is up 4 pounds at 247 with a BMI of 33.5 once again we discussed patient stopping obvious sweets and eating foods that are high in carbs with some aerobic exercise      Fasting visit in 3 months monitor blood pressures at home             Vitals:    03/29/21 1305   BP: 125/83   BP Location: Right arm   Patient Position: Sitting   Cuff Size: Large Adult   Pulse: 73   Temp: 97.1 °F (36.2 °C)   TempSrc: Temporal   SpO2: 97%   Weight: 112 kg (246 lb 12.8 oz)   Height: 182.9 cm (72\")     Body mass index is 33.47 kg/m².    Past Medical History:   Diagnosis Date   • ADHD (attention deficit hyperactivity disorder)    • Hypertension      Past Surgical History:   Procedure Laterality Date   • EYE SURGERY     • HERNIA REPAIR       Family History   Problem Relation Age of Onset   • Heart disease Mother    • Diabetes Father    • Lung cancer Father      Immunization History   Administered Date(s) Administered   • COVID-19 (MODERNA) 03/18/2021       Office Visit on 11/23/2020   Component Date Value Ref Range Status   • WBC 11/23/2020 8.6  3.4 - 10.8 x10E3/uL Final   • RBC 11/23/2020 5.92* 4.14 - 5.80 x10E6/uL Final   • Hemoglobin 11/23/2020 17.9* 13.0 - 17.7 g/dL Final   • Hematocrit 11/23/2020 52.4* 37.5 - 51.0 % Final   • MCV 11/23/2020 89  79 - 97 fL Final   • MCH 11/23/2020 30.2  26.6 - 33.0 pg Final   • MCHC 11/23/2020 34.2  31.5 - 35.7 g/dL Final   • RDW 11/23/2020 12.3  11.6 - 15.4 % Final   • Platelets " 11/23/2020 210  150 - 450 x10E3/uL Final   • Neutrophil Rel % 11/23/2020 69  Not Estab. % Final   • Lymphocyte Rel % 11/23/2020 21  Not Estab. % Final   • Monocyte Rel % 11/23/2020 7  Not Estab. % Final   • Eosinophil Rel % 11/23/2020 3  Not Estab. % Final   • Basophil Rel % 11/23/2020 0  Not Estab. % Final   • Neutrophils Absolute 11/23/2020 5.9  1.4 - 7.0 x10E3/uL Final   • Lymphocytes Absolute 11/23/2020 1.8  0.7 - 3.1 x10E3/uL Final   • Monocytes Absolute 11/23/2020 0.6  0.1 - 0.9 x10E3/uL Final   • Eosinophils Absolute 11/23/2020 0.3  0.0 - 0.4 x10E3/uL Final   • Basophils Absolute 11/23/2020 0.0  0.0 - 0.2 x10E3/uL Final   • Immature Granulocyte Rel % 11/23/2020 0  Not Estab. % Final   • Immature Grans Absolute 11/23/2020 0.0  0.0 - 0.1 x10E3/uL Final   • Glucose 11/23/2020 91  65 - 99 mg/dL Final   • BUN 11/23/2020 12  6 - 24 mg/dL Final   • Creatinine 11/23/2020 0.87  0.76 - 1.27 mg/dL Final   • eGFR Non  Am 11/23/2020 98  >59 mL/min/1.73 Final   • eGFR African Am 11/23/2020 113  >59 mL/min/1.73 Final   • BUN/Creatinine Ratio 11/23/2020 14  9 - 20 Final   • Sodium 11/23/2020 141  134 - 144 mmol/L Final   • Potassium 11/23/2020 4.3  3.5 - 5.2 mmol/L Final   • Chloride 11/23/2020 102  96 - 106 mmol/L Final   • Total CO2 11/23/2020 28  20 - 29 mmol/L Final   • Calcium 11/23/2020 9.8  8.7 - 10.2 mg/dL Final   • Total Protein 11/23/2020 6.4  6.0 - 8.5 g/dL Final   • Albumin 11/23/2020 4.4  3.8 - 4.9 g/dL Final   • Globulin 11/23/2020 2.0  1.5 - 4.5 g/dL Final   • A/G Ratio 11/23/2020 2.2  1.2 - 2.2 Final   • Total Bilirubin 11/23/2020 0.6  0.0 - 1.2 mg/dL Final   • Alkaline Phosphatase 11/23/2020 102  39 - 117 IU/L Final   • AST (SGOT) 11/23/2020 32  0 - 40 IU/L Final   • ALT (SGPT) 11/23/2020 23  0 - 44 IU/L Final   • Total Cholesterol 11/23/2020 162  100 - 199 mg/dL Final   • Triglycerides 11/23/2020 158* 0 - 149 mg/dL Final   • HDL Cholesterol 11/23/2020 34* >39 mg/dL Final   • VLDL Cholesterol Carlos  11/23/2020 28  5 - 40 mg/dL Final   • LDL Chol Calc ([facility]) 11/23/2020 100* 0 - 99 mg/dL Final   • LDL/HDL RATIO 11/23/2020 2.9  0.0 - 3.6 ratio Final    Comment:                                     LDL/HDL Ratio                                              Men  Women                                1/2 Avg.Risk  1.0    1.5                                    Avg.Risk  3.6    3.2                                 2X Avg.Risk  6.2    5.0                                 3X Avg.Risk  8.0    6.1     • PSA 11/23/2020 3.9  0.0 - 4.0 ng/mL Final    Comment: Roche ECLIA methodology.  According to the American Urological Association, Serum PSA should  decrease and remain at undetectable levels after radical  prostatectomy. The AUA defines biochemical recurrence as an initial  PSA value 0.2 ng/mL or greater followed by a subsequent confirmatory  PSA value 0.2 ng/mL or greater.  Values obtained with different assay methods or kits cannot be used  interchangeably. Results cannot be interpreted as absolute evidence  of the presence or absence of malignant disease.           Review of Systems   Constitutional: Negative.    HENT: Negative.    Respiratory: Negative.    Cardiovascular: Negative.    Genitourinary: Negative.    Musculoskeletal: Negative.    Skin: Negative.    Psychiatric/Behavioral: Negative.        Objective   Physical Exam  Constitutional:       Appearance: Normal appearance.   HENT:      Head: Normocephalic.   Cardiovascular:      Rate and Rhythm: Normal rate and regular rhythm.      Pulses: Normal pulses.      Heart sounds: Normal heart sounds.   Pulmonary:      Effort: Pulmonary effort is normal.      Breath sounds: Normal breath sounds.   Abdominal:      General: Bowel sounds are normal.   Musculoskeletal:         General: Normal range of motion.      Cervical back: Normal range of motion.   Skin:     General: Skin is warm and dry.   Neurological:      General: No focal deficit present.      Mental Status: He is alert  and oriented to person, place, and time.   Psychiatric:         Mood and Affect: Mood normal.         Behavior: Behavior normal.         Procedures    Assessment/Plan   Diagnoses and all orders for this visit:    1. Polycythemia (Primary)    2. Attention deficit disorder (ADD) without hyperactivity  -     amphetamine-dextroamphetamine (Adderall) 12.5 MG tablet; Take 1 tablet by mouth 2 (Two) Times a Day. Needs 3-month follow-up visit  Dispense: 30 tablet; Refill: 0    3. Class 1 obesity due to excess calories without serious comorbidity with body mass index (BMI) of 33.0 to 33.9 in adult          Current Outpatient Medications:   •  amphetamine-dextroamphetamine (Adderall) 12.5 MG tablet, Take 1 tablet by mouth 2 (Two) Times a Day. Needs 3-month follow-up visit, Disp: 30 tablet, Rfl: 0  •  cyclobenzaprine (FLEXERIL) 10 MG tablet, Take 1 tablet by mouth At Night As Needed for Muscle Spasms., Disp: 30 tablet, Rfl: 2  •  sildenafil (Viagra) 100 MG tablet, Take 1 tablet by mouth Daily As Needed for Erectile Dysfunction., Disp: 15 tablet, Rfl: 0  •  triamterene-hydrochlorothiazide (DYAZIDE) 37.5-25 MG per capsule, Take 1 capsule by mouth Daily., Disp: 90 capsule, Rfl: 1

## 2021-04-14 DIAGNOSIS — Z12.11 COLON CANCER SCREENING: Primary | ICD-10-CM

## 2021-05-27 DIAGNOSIS — F98.8 ATTENTION DEFICIT DISORDER (ADD) WITHOUT HYPERACTIVITY: ICD-10-CM

## 2021-05-27 RX ORDER — DEXTROAMPHETAMINE SACCHARATE, AMPHETAMINE ASPARTATE, DEXTROAMPHETAMINE SULFATE AND AMPHETAMINE SULFATE 3.125; 3.125; 3.125; 3.125 MG/1; MG/1; MG/1; MG/1
12.5 TABLET ORAL 2 TIMES DAILY
Qty: 30 TABLET | Refills: 0 | Status: SHIPPED | OUTPATIENT
Start: 2021-05-27 | End: 2021-06-28 | Stop reason: SDUPTHER

## 2021-05-27 NOTE — TELEPHONE ENCOUNTER
Caller: Kendell Mccall    Relationship: Self    Best call back number: [phone]    Medication needed:   Requested Prescriptions     Pending Prescriptions Disp Refills   • amphetamine-dextroamphetamine (Adderall) 12.5 MG tablet 30 tablet 0     Sig: Take 1 tablet by mouth 2 (Two) Times a Day. Needs 3-month follow-up visit       When do you need the refill by:ASAP    What additional details did the patient provide when requesting the medication: PATIENT HAS LESS THEN 3 DAY SUPPLY    Does the patient have less than a 3 day supply:  [x] Yes  [] No    What is the patient's preferred pharmacy: [facility]/PHARMACY #[id number] - [place] - [address] - [phone]  - [phone] FX

## 2021-06-28 ENCOUNTER — OFFICE VISIT (OUTPATIENT)
Dept: FAMILY MEDICINE CLINIC | Facility: CLINIC | Age: 56
End: 2021-06-28

## 2021-06-28 VITALS
HEART RATE: 76 BPM | DIASTOLIC BLOOD PRESSURE: 78 MMHG | BODY MASS INDEX: 32.59 KG/M2 | OXYGEN SATURATION: 99 % | HEIGHT: 72 IN | TEMPERATURE: 98.6 F | WEIGHT: 240.6 LBS | SYSTOLIC BLOOD PRESSURE: 139 MMHG

## 2021-06-28 DIAGNOSIS — Z00.00 PREVENTATIVE HEALTH CARE: ICD-10-CM

## 2021-06-28 DIAGNOSIS — F98.8 ATTENTION DEFICIT DISORDER (ADD) WITHOUT HYPERACTIVITY: ICD-10-CM

## 2021-06-28 DIAGNOSIS — D75.1 POLYCYTHEMIA: ICD-10-CM

## 2021-06-28 DIAGNOSIS — E78.2 MIXED HYPERLIPIDEMIA: Primary | ICD-10-CM

## 2021-06-28 DIAGNOSIS — S46.209A INJURY OF TENDON OF BICEPS: ICD-10-CM

## 2021-06-28 PROCEDURE — 99214 OFFICE O/P EST MOD 30 MIN: CPT | Performed by: NURSE PRACTITIONER

## 2021-06-28 RX ORDER — DEXTROAMPHETAMINE SACCHARATE, AMPHETAMINE ASPARTATE, DEXTROAMPHETAMINE SULFATE AND AMPHETAMINE SULFATE 3.125; 3.125; 3.125; 3.125 MG/1; MG/1; MG/1; MG/1
12.5 TABLET ORAL 2 TIMES DAILY
Qty: 30 TABLET | Refills: 0 | Status: SHIPPED | OUTPATIENT
Start: 2021-06-28 | End: 2021-07-20 | Stop reason: SDUPTHER

## 2021-06-28 NOTE — PATIENT INSTRUCTIONS
Fasting blood work  Call office if right bicep injury does not improve  Continue with weight loss watching carbs and obvious sweets   Follow-up 3 months

## 2021-06-28 NOTE — PROGRESS NOTES
"    Kendell Mccall is a 55 y.o. male.     55-year-old white male smoker with history ADHD, chronic low back pain and hypertension who comes in today for follow-up visit and fasting blood work  Blood pressure 138/78 heart rate 76 he denies any chest pain, dyspnea, tachycardia or dizziness  Patient's last hemoglobin was 17.9 and  will be checking CBC today  Patient complains of right bicep injury that is finally starting to heal I encouraged him to use ice and heat and ibuprofen for pain if it does not improve we will try to get an MRI  Patient's ADHD medicine continues to work very well for him even on his new job which is more hectic  Weight is 241 which is down 6 pounds with a BMI of 32.6 and patient has started a new job where he is getting more exercise and he hopes to continue to lose weight  Patient up-to-date on Covid shot eye exam PSA due in 2021 and he had a  negative Cologuard    Fasting blood work  Call office if right bicep injury does not improve  Continue with weight loss watching carbs and obvious sweets   Follow-up 3 months         The following portions of the patient's history were reviewed and updated as appropriate: allergies, current medications, past family history, past medical history, past social history, past surgical history and problem list.    Vitals:    06/28/21 1416   BP: 139/78   BP Location: Right arm   Patient Position: Sitting   Cuff Size: Large Adult   Pulse: 76   Temp: 98.6 °F (37 °C)   TempSrc: Temporal   SpO2: 99%   Weight: 109 kg (240 lb 9.6 oz)   Height: 182.9 cm (72\")     Body mass index is 32.63 kg/m².    Past Medical History:   Diagnosis Date   • ADHD (attention deficit hyperactivity disorder)    • Hypertension      Past Surgical History:   Procedure Laterality Date   • EYE SURGERY     • HERNIA REPAIR       Family History   Problem Relation Age of Onset   • Heart disease Mother    • Diabetes Father    • Lung cancer Father      Immunization History   Administered Date(s) " Administered   • COVID-19 (MODERNA) 03/18/2021       Office Visit on 11/23/2020   Component Date Value Ref Range Status   • WBC 11/23/2020 8.6  3.4 - 10.8 x10E3/uL Final   • RBC 11/23/2020 5.92* 4.14 - 5.80 x10E6/uL Final   • Hemoglobin 11/23/2020 17.9* 13.0 - 17.7 g/dL Final   • Hematocrit 11/23/2020 52.4* 37.5 - 51.0 % Final   • MCV 11/23/2020 89  79 - 97 fL Final   • MCH 11/23/2020 30.2  26.6 - 33.0 pg Final   • MCHC 11/23/2020 34.2  31.5 - 35.7 g/dL Final   • RDW 11/23/2020 12.3  11.6 - 15.4 % Final   • Platelets 11/23/2020 210  150 - 450 x10E3/uL Final   • Neutrophil Rel % 11/23/2020 69  Not Estab. % Final   • Lymphocyte Rel % 11/23/2020 21  Not Estab. % Final   • Monocyte Rel % 11/23/2020 7  Not Estab. % Final   • Eosinophil Rel % 11/23/2020 3  Not Estab. % Final   • Basophil Rel % 11/23/2020 0  Not Estab. % Final   • Neutrophils Absolute 11/23/2020 5.9  1.4 - 7.0 x10E3/uL Final   • Lymphocytes Absolute 11/23/2020 1.8  0.7 - 3.1 x10E3/uL Final   • Monocytes Absolute 11/23/2020 0.6  0.1 - 0.9 x10E3/uL Final   • Eosinophils Absolute 11/23/2020 0.3  0.0 - 0.4 x10E3/uL Final   • Basophils Absolute 11/23/2020 0.0  0.0 - 0.2 x10E3/uL Final   • Immature Granulocyte Rel % 11/23/2020 0  Not Estab. % Final   • Immature Grans Absolute 11/23/2020 0.0  0.0 - 0.1 x10E3/uL Final   • Glucose 11/23/2020 91  65 - 99 mg/dL Final   • BUN 11/23/2020 12  6 - 24 mg/dL Final   • Creatinine 11/23/2020 0.87  0.76 - 1.27 mg/dL Final   • eGFR Non  Am 11/23/2020 98  >59 mL/min/1.73 Final   • eGFR African Am 11/23/2020 113  >59 mL/min/1.73 Final   • BUN/Creatinine Ratio 11/23/2020 14  9 - 20 Final   • Sodium 11/23/2020 141  134 - 144 mmol/L Final   • Potassium 11/23/2020 4.3  3.5 - 5.2 mmol/L Final   • Chloride 11/23/2020 102  96 - 106 mmol/L Final   • Total CO2 11/23/2020 28  20 - 29 mmol/L Final   • Calcium 11/23/2020 9.8  8.7 - 10.2 mg/dL Final   • Total Protein 11/23/2020 6.4  6.0 - 8.5 g/dL Final   • Albumin 11/23/2020 4.4  3.8  - 4.9 g/dL Final   • Globulin 11/23/2020 2.0  1.5 - 4.5 g/dL Final   • A/G Ratio 11/23/2020 2.2  1.2 - 2.2 Final   • Total Bilirubin 11/23/2020 0.6  0.0 - 1.2 mg/dL Final   • Alkaline Phosphatase 11/23/2020 102  39 - 117 IU/L Final   • AST (SGOT) 11/23/2020 32  0 - 40 IU/L Final   • ALT (SGPT) 11/23/2020 23  0 - 44 IU/L Final   • Total Cholesterol 11/23/2020 162  100 - 199 mg/dL Final   • Triglycerides 11/23/2020 158* 0 - 149 mg/dL Final   • HDL Cholesterol 11/23/2020 34* >39 mg/dL Final   • VLDL Cholesterol Carlos 11/23/2020 28  5 - 40 mg/dL Final   • LDL Chol Calc ([facility]) 11/23/2020 100* 0 - 99 mg/dL Final   • LDL/HDL RATIO 11/23/2020 2.9  0.0 - 3.6 ratio Final    Comment:                                     LDL/HDL Ratio                                              Men  Women                                1/2 Avg.Risk  1.0    1.5                                    Avg.Risk  3.6    3.2                                 2X Avg.Risk  6.2    5.0                                 3X Avg.Risk  8.0    6.1     • PSA 11/23/2020 3.9  0.0 - 4.0 ng/mL Final    Comment: Roche ECLIA methodology.  According to the American Urological Association, Serum PSA should  decrease and remain at undetectable levels after radical  prostatectomy. The AUA defines biochemical recurrence as an initial  PSA value 0.2 ng/mL or greater followed by a subsequent confirmatory  PSA value 0.2 ng/mL or greater.  Values obtained with different assay methods or kits cannot be used  interchangeably. Results cannot be interpreted as absolute evidence  of the presence or absence of malignant disease.           Review of Systems   Constitutional: Negative.    HENT: Negative.    Respiratory: Negative.    Cardiovascular: Negative.    Gastrointestinal: Negative.    Genitourinary: Negative.    Skin: Negative.    Neurological: Negative.    Psychiatric/Behavioral: Negative.        Objective   Physical Exam  Constitutional:       Appearance: Normal appearance.    Cardiovascular:      Rate and Rhythm: Normal rate and regular rhythm.      Pulses: Normal pulses.      Heart sounds: Normal heart sounds.   Pulmonary:      Effort: Pulmonary effort is normal.   Abdominal:      General: Bowel sounds are normal.   Musculoskeletal:         General: Normal range of motion.      Cervical back: Normal range of motion.      Comments: Right bicep tenderness   Skin:     General: Skin is warm and dry.   Neurological:      General: No focal deficit present.      Mental Status: He is alert and oriented to person, place, and time.   Psychiatric:         Mood and Affect: Mood normal.         Behavior: Behavior normal.         Procedures    Assessment/Plan   Diagnoses and all orders for this visit:    1. Mixed hyperlipidemia (Primary)  -     Lipid Panel With LDL / HDL Ratio; Future    2. Attention deficit disorder (ADD) without hyperactivity  -     amphetamine-dextroamphetamine (Adderall) 12.5 MG tablet; Take 1 tablet by mouth 2 (Two) Times a Day. Needs 3-month follow-up visit  Dispense: 30 tablet; Refill: 0    3. Polycythemia  -     CBC & Differential; Future    4. Preventative health care  -     Comprehensive Metabolic Panel; Future    5. Injury of tendon of biceps          Current Outpatient Medications:   •  amphetamine-dextroamphetamine (Adderall) 12.5 MG tablet, Take 1 tablet by mouth 2 (Two) Times a Day. Needs 3-month follow-up visit, Disp: 30 tablet, Rfl: 0  •  cyclobenzaprine (FLEXERIL) 10 MG tablet, Take 1 tablet by mouth At Night As Needed for Muscle Spasms., Disp: 30 tablet, Rfl: 2  •  sildenafil (Viagra) 100 MG tablet, Take 1 tablet by mouth Daily As Needed for Erectile Dysfunction., Disp: 15 tablet, Rfl: 0  •  triamterene-hydrochlorothiazide (DYAZIDE) 37.5-25 MG per capsule, Take 1 capsule by mouth Daily., Disp: 90 capsule, Rfl: 1

## 2021-07-20 DIAGNOSIS — F98.8 ATTENTION DEFICIT DISORDER (ADD) WITHOUT HYPERACTIVITY: ICD-10-CM

## 2021-07-20 RX ORDER — DEXTROAMPHETAMINE SACCHARATE, AMPHETAMINE ASPARTATE, DEXTROAMPHETAMINE SULFATE AND AMPHETAMINE SULFATE 3.125; 3.125; 3.125; 3.125 MG/1; MG/1; MG/1; MG/1
12.5 TABLET ORAL 2 TIMES DAILY
Qty: 30 TABLET | Refills: 0 | Status: SHIPPED | OUTPATIENT
Start: 2021-07-20 | End: 2021-08-20 | Stop reason: SDUPTHER

## 2021-07-20 NOTE — TELEPHONE ENCOUNTER
Caller: Kendell Mccall    Relationship: Self    Best call back number:[phone]    Medication needed:   Requested Prescriptions     Pending Prescriptions Disp Refills   • amphetamine-dextroamphetamine (Adderall) 12.5 MG tablet 30 tablet 0     Sig: Take 1 tablet by mouth 2 (Two) Times a Day. Needs 3-month follow-up visit       When do you need the refill by: ASAP    Does the patient have less than a 3 day supply:  [x] Yes  [] No    What is the patient's preferred pharmacy: [facility]/PHARMACY #[id number] - [place] [facility] [address] - [phone]  - [phone] FX

## 2021-08-10 ENCOUNTER — OFFICE VISIT (OUTPATIENT)
Dept: FAMILY MEDICINE CLINIC | Facility: CLINIC | Age: 56
End: 2021-08-10

## 2021-08-10 VITALS
OXYGEN SATURATION: 96 % | BODY MASS INDEX: 32.89 KG/M2 | WEIGHT: 242.8 LBS | SYSTOLIC BLOOD PRESSURE: 136 MMHG | HEART RATE: 81 BPM | TEMPERATURE: 98.2 F | DIASTOLIC BLOOD PRESSURE: 84 MMHG | HEIGHT: 72 IN

## 2021-08-10 DIAGNOSIS — M25.512 PAIN OF LEFT SHOULDER JOINT ON MOVEMENT: ICD-10-CM

## 2021-08-10 DIAGNOSIS — M25.512 ACUTE PAIN OF LEFT SHOULDER: Primary | ICD-10-CM

## 2021-08-10 DIAGNOSIS — E66.09 CLASS 1 OBESITY DUE TO EXCESS CALORIES WITHOUT SERIOUS COMORBIDITY WITH BODY MASS INDEX (BMI) OF 33.0 TO 33.9 IN ADULT: ICD-10-CM

## 2021-08-10 PROCEDURE — 99213 OFFICE O/P EST LOW 20 MIN: CPT | Performed by: NURSE PRACTITIONER

## 2021-08-10 RX ORDER — PREDNISONE 5 MG/1
TABLET ORAL
Qty: 20 TABLET | Refills: 0 | Status: SHIPPED | OUTPATIENT
Start: 2021-08-10 | End: 2021-08-18

## 2021-08-10 NOTE — PATIENT INSTRUCTIONS
Prednisone 5 mg taper dose  Aleve 2 tablets twice daily x4 to 5 days  May use extra strength Tylenol 2 tabs 3 times a day if needed  Flexeril 10 mg 1-3 times a day monitor for drowsiness  Left shoulder x-ray  Voltaren gel to left shoulder 4 times a day  Off work till Monday  Call office if no improvement within 2 weeks

## 2021-08-10 NOTE — PROGRESS NOTES
"    Kendell Mccall is a 55 y.o. male.     55-year-old white male smoker with history of ADHD, chronic low back pain and hypertension who was here on 628 with complains of bicep strain. Patient states that has improved and for the most part is gone. He said he has a history of left shoulder pain for about 15 years that comes and goes. He states is gotten worse in the last few years and states he was mowing grass this weekend and hit a curb which jerked and brandon his shoulder really badly and now he is complaining of pain in the front of his left shoulder with any type of movement and also at rest when trying to sleep. I am going to get an x-ray today put him on steroids anti-inflammatories and muscle relaxers and off work till Monday to see if this improves. If no improvement may need to get MRI    Blood pressure 136/84 heart rate 80 he denies any chest pain, dyspnea, tachycardia or dizziness  Weight is 243 with a BMI of 33          Prednisone 5 mg taper dose  Aleve 2 tablets twice daily x4 to 5 days  May use extra strength Tylenol 2 tabs 3 times a day if needed  Flexeril 10 mg 1-3 times a day monitor for drowsiness  Left shoulder x-ray  Voltaren gel to left shoulder 4 times a day  Off work till Monday  Call office if no improvement within 2 weeks       The following portions of the patient's history were reviewed and updated as appropriate: allergies, current medications, past family history, past medical history, past social history, past surgical history and problem list.    Vitals:    08/10/21 1109   BP: 136/84   BP Location: Right arm   Patient Position: Sitting   Cuff Size: Large Adult   Pulse: 81   Temp: 98.2 °F (36.8 °C)   TempSrc: Temporal   SpO2: 96%   Weight: 110 kg (242 lb 12.8 oz)   Height: 182.9 cm (72\")     Body mass index is 32.93 kg/m².    Past Medical History:   Diagnosis Date   • ADHD (attention deficit hyperactivity disorder)    • Hypertension      Past Surgical History:   Procedure Laterality " Date   • EYE SURGERY     • HERNIA REPAIR       Family History   Problem Relation Age of Onset   • Heart disease Mother    • Diabetes Father    • Lung cancer Father      Immunization History   Administered Date(s) Administered   • COVID-19 (MODERNA) 03/18/2021       Office Visit on 11/23/2020   Component Date Value Ref Range Status   • WBC 11/23/2020 8.6  3.4 - 10.8 x10E3/uL Final   • RBC 11/23/2020 5.92* 4.14 - 5.80 x10E6/uL Final   • Hemoglobin 11/23/2020 17.9* 13.0 - 17.7 g/dL Final   • Hematocrit 11/23/2020 52.4* 37.5 - 51.0 % Final   • MCV 11/23/2020 89  79 - 97 fL Final   • MCH 11/23/2020 30.2  26.6 - 33.0 pg Final   • MCHC 11/23/2020 34.2  31.5 - 35.7 g/dL Final   • RDW 11/23/2020 12.3  11.6 - 15.4 % Final   • Platelets 11/23/2020 210  150 - 450 x10E3/uL Final   • Neutrophil Rel % 11/23/2020 69  Not Estab. % Final   • Lymphocyte Rel % 11/23/2020 21  Not Estab. % Final   • Monocyte Rel % 11/23/2020 7  Not Estab. % Final   • Eosinophil Rel % 11/23/2020 3  Not Estab. % Final   • Basophil Rel % 11/23/2020 0  Not Estab. % Final   • Neutrophils Absolute 11/23/2020 5.9  1.4 - 7.0 x10E3/uL Final   • Lymphocytes Absolute 11/23/2020 1.8  0.7 - 3.1 x10E3/uL Final   • Monocytes Absolute 11/23/2020 0.6  0.1 - 0.9 x10E3/uL Final   • Eosinophils Absolute 11/23/2020 0.3  0.0 - 0.4 x10E3/uL Final   • Basophils Absolute 11/23/2020 0.0  0.0 - 0.2 x10E3/uL Final   • Immature Granulocyte Rel % 11/23/2020 0  Not Estab. % Final   • Immature Grans Absolute 11/23/2020 0.0  0.0 - 0.1 x10E3/uL Final   • Glucose 11/23/2020 91  65 - 99 mg/dL Final   • BUN 11/23/2020 12  6 - 24 mg/dL Final   • Creatinine 11/23/2020 0.87  0.76 - 1.27 mg/dL Final   • eGFR Non  Am 11/23/2020 98  >59 mL/min/1.73 Final   • eGFR African Am 11/23/2020 113  >59 mL/min/1.73 Final   • BUN/Creatinine Ratio 11/23/2020 14  9 - 20 Final   • Sodium 11/23/2020 141  134 - 144 mmol/L Final   • Potassium 11/23/2020 4.3  3.5 - 5.2 mmol/L Final   • Chloride 11/23/2020  102  96 - 106 mmol/L Final   • Total CO2 11/23/2020 28  20 - 29 mmol/L Final   • Calcium 11/23/2020 9.8  8.7 - 10.2 mg/dL Final   • Total Protein 11/23/2020 6.4  6.0 - 8.5 g/dL Final   • Albumin 11/23/2020 4.4  3.8 - 4.9 g/dL Final   • Globulin 11/23/2020 2.0  1.5 - 4.5 g/dL Final   • A/G Ratio 11/23/2020 2.2  1.2 - 2.2 Final   • Total Bilirubin 11/23/2020 0.6  0.0 - 1.2 mg/dL Final   • Alkaline Phosphatase 11/23/2020 102  39 - 117 IU/L Final   • AST (SGOT) 11/23/2020 32  0 - 40 IU/L Final   • ALT (SGPT) 11/23/2020 23  0 - 44 IU/L Final   • Total Cholesterol 11/23/2020 162  100 - 199 mg/dL Final   • Triglycerides 11/23/2020 158* 0 - 149 mg/dL Final   • HDL Cholesterol 11/23/2020 34* >39 mg/dL Final   • VLDL Cholesterol Carlos 11/23/2020 28  5 - 40 mg/dL Final   • LDL Chol Calc (NIH) 11/23/2020 100* 0 - 99 mg/dL Final   • LDL/HDL RATIO 11/23/2020 2.9  0.0 - 3.6 ratio Final    Comment:                                     LDL/HDL Ratio                                              Men  Women                                1/2 Avg.Risk  1.0    1.5                                    Avg.Risk  3.6    3.2                                 2X Avg.Risk  6.2    5.0                                 3X Avg.Risk  8.0    6.1     • PSA 11/23/2020 3.9  0.0 - 4.0 ng/mL Final    Comment: Roche ECLIA methodology.  According to the American Urological Association, Serum PSA should  decrease and remain at undetectable levels after radical  prostatectomy. The AUA defines biochemical recurrence as an initial  PSA value 0.2 ng/mL or greater followed by a subsequent confirmatory  PSA value 0.2 ng/mL or greater.  Values obtained with different assay methods or kits cannot be used  interchangeably. Results cannot be interpreted as absolute evidence  of the presence or absence of malignant disease.           Review of Systems   Constitutional: Negative.    HENT: Negative.    Respiratory: Negative.    Cardiovascular: Negative.    Gastrointestinal:  Negative.    Genitourinary: Negative.    Musculoskeletal:        Left shoulder pain   Skin: Negative.    Neurological: Negative.    Psychiatric/Behavioral: Negative.        Objective   Physical Exam  Constitutional:       Appearance: Normal appearance.   HENT:      Head: Normocephalic.   Cardiovascular:      Rate and Rhythm: Normal rate and regular rhythm.      Pulses: Normal pulses.      Heart sounds: Normal heart sounds.   Pulmonary:      Effort: Pulmonary effort is normal.      Breath sounds: Normal breath sounds.   Abdominal:      General: Bowel sounds are normal.   Musculoskeletal:      Comments: Complains of pain in front of left shoulder with tingling down left arm. Pain is worse with movement especially lifting arm up from side of body.   Skin:     General: Skin is warm and dry.   Neurological:      General: No focal deficit present.      Mental Status: He is alert and oriented to person, place, and time.         Procedures    Assessment/Plan   Diagnoses and all orders for this visit:    1. Acute pain of left shoulder (Primary)  -     XR Shoulder 2+ View Left    2. Pain of left shoulder joint on movement    3. Class 1 obesity due to excess calories without serious comorbidity with body mass index (BMI) of 33.0 to 33.9 in adult    Other orders  -     predniSONE 5 MG (48) tablet therapy pack dose pack; Take 4 tablets by mouth Daily for 2 days, THEN 3 tablets Daily for 2 days, THEN 2 tablets Daily for 2 days, THEN 1 tablet Daily for 2 days.  Dispense: 20 tablet; Refill: 0  -     Diclofenac Sodium (VOLTAREN) 1 % gel gel; Apply 4 g topically to the appropriate area as directed 4 (Four) Times a Day.  Dispense: 150 g; Refill: 2          Current Outpatient Medications:   •  amphetamine-dextroamphetamine (Adderall) 12.5 MG tablet, Take 1 tablet by mouth 2 (Two) Times a Day. Needs 3-month follow-up visit, Disp: 30 tablet, Rfl: 0  •  sildenafil (Viagra) 100 MG tablet, Take 1 tablet by mouth Daily As Needed for Erectile  Dysfunction., Disp: 15 tablet, Rfl: 0  •  triamterene-hydrochlorothiazide (DYAZIDE) 37.5-25 MG per capsule, Take 1 capsule by mouth Daily., Disp: 90 capsule, Rfl: 1  •  Diclofenac Sodium (VOLTAREN) 1 % gel gel, Apply 4 g topically to the appropriate area as directed 4 (Four) Times a Day., Disp: 150 g, Rfl: 2  •  predniSONE 5 MG (48) tablet therapy pack dose pack, Take 4 tablets by mouth Daily for 2 days, THEN 3 tablets Daily for 2 days, THEN 2 tablets Daily for 2 days, THEN 1 tablet Daily for 2 days., Disp: 20 tablet, Rfl: 0

## 2021-08-20 DIAGNOSIS — F98.8 ATTENTION DEFICIT DISORDER (ADD) WITHOUT HYPERACTIVITY: ICD-10-CM

## 2021-08-20 NOTE — TELEPHONE ENCOUNTER
Caller: Kendell Mccall    Relationship: Self    Best call back number: [phone]    Medication needed:   Requested Prescriptions     Pending Prescriptions Disp Refills   • amphetamine-dextroamphetamine (Adderall) 12.5 MG tablet 30 tablet 0     Sig: Take 1 tablet by mouth 2 (Two) Times a Day. Needs 3-month follow-up visit       When do you need the refill by: ASAP    What additional details did the patient provide when requesting the medication: PATIENT STATED THAT HE HAS ONLY RECEIVED HALF PRESCRIPTIONS THE LAST THREE TIMES HE HAS FILLED IT AND WANTS TO KNOW IF HE CAN GET A FULL PRESCRIPTION FILLED THIS TIME.     Does the patient have less than a 3 day supply:  [x] Yes  [] No    What is the patient's preferred pharmacy: [facility]/PHARMACY #[id number] - [place] - [address] - [phone]  - [phone] FX

## 2021-08-22 RX ORDER — DEXTROAMPHETAMINE SACCHARATE, AMPHETAMINE ASPARTATE, DEXTROAMPHETAMINE SULFATE AND AMPHETAMINE SULFATE 3.125; 3.125; 3.125; 3.125 MG/1; MG/1; MG/1; MG/1
12.5 TABLET ORAL 2 TIMES DAILY
Qty: 30 TABLET | Refills: 0 | Status: SHIPPED | OUTPATIENT
Start: 2021-08-22 | End: 2021-09-13 | Stop reason: SDUPTHER

## 2021-09-13 DIAGNOSIS — F98.8 ATTENTION DEFICIT DISORDER (ADD) WITHOUT HYPERACTIVITY: ICD-10-CM

## 2021-09-13 RX ORDER — DEXTROAMPHETAMINE SACCHARATE, AMPHETAMINE ASPARTATE, DEXTROAMPHETAMINE SULFATE AND AMPHETAMINE SULFATE 3.125; 3.125; 3.125; 3.125 MG/1; MG/1; MG/1; MG/1
12.5 TABLET ORAL 2 TIMES DAILY
Qty: 60 TABLET | Refills: 0 | Status: SHIPPED | OUTPATIENT
Start: 2021-09-13 | End: 2021-11-30 | Stop reason: SDUPTHER

## 2021-11-29 ENCOUNTER — TREATMENT (OUTPATIENT)
Dept: PHYSICAL THERAPY | Facility: CLINIC | Age: 56
End: 2021-11-29

## 2021-11-29 DIAGNOSIS — S46.911A STRAIN OF RIGHT SHOULDER, INITIAL ENCOUNTER: ICD-10-CM

## 2021-11-29 DIAGNOSIS — S49.91XA INJURY OF RIGHT SHOULDER, INITIAL ENCOUNTER: Primary | ICD-10-CM

## 2021-11-29 PROCEDURE — 97161 PT EVAL LOW COMPLEX 20 MIN: CPT | Performed by: PHYSICAL THERAPIST

## 2021-11-29 PROCEDURE — 97110 THERAPEUTIC EXERCISES: CPT | Performed by: PHYSICAL THERAPIST

## 2021-11-29 NOTE — PROGRESS NOTES
Addended by: BEVERLEY REY on: 7/30/2020 01:52 PM     Modules accepted: Orders     Physical Therapy Initial Evaluation and Plan of Care    Patient: Kendell Mccall   : 1965  Diagnosis/ICD-10 Code:  Injury of right shoulder, initial encounter [S49.91XA]  Referring practitioner: ANTONIO Gutiérrez  Date of Initial Visit: 2021  Today's Date: 2021  Patient seen for 1 sessions           Subjective Questionnaire: QuickDASH: 50%  Subjective Questionnaire: QuickDASH WORK: 62.5%      Subjective Evaluation    History of Present Illness  Date of onset: 2021  Mechanism of injury: 55 yo male who reports to clinic today with current complaints of right shoulder pain with movement.  He reports that on 21, he fell onto a skid while trying to break off a board and fell onto his outstretched R arm and his right side.  He had immediate pain but was able to finish his immediate tasks and then he reported to his work.   He was sent to occupational health immediate care and was given x-rays and found to have shoulder strain and multiple contusions to the  R shoulder, hand, thigh and ribs.    He reports to PT with orders for evaluation,  therapeutic exercises, and modalities.      Patient Occupation:    Precautions and Work Restrictions: No lifting >5#; no pushing or pulling >5#; No use of R arm; sit-down as needed. [facility] on [facility]  Current pain ratin  At best pain ratin  At worst pain ratin  Location: right anterior shoulder  Quality: sharp  Relieving factors: rest and ice  Aggravating factors: movement  Progression: no change    Social Support  Lives in: one-story house  Lives with: alone    Hand dominance: right    Treatments  Previous treatment: medication  Current treatment: physical therapy  Patient Goals  Patient goals for therapy: decreased pain, increased strength, increased motion and return to work             Objective          Cervical/Thoracic Screen   Cervical range of motion within normal limits    Neurological Testing      Sensation     Shoulder   Left Shoulder   Intact: light touch    Right Shoulder   Intact: light touch    Active Range of Motion     Right Shoulder   Flexion: 60 degrees with pain  Abduction: 60 degrees with pain  External rotation 45°: 65 degrees     Passive Range of Motion     Right Shoulder   Flexion: WFL  Abduction: WFL  External rotation 45°: 70 degrees   Internal rotation 45°: WFL    Scapular Mobility     Right Shoulder   Scapular mobility: fair    Joint Play     Right Shoulder  Hypomobile in the posterior capsule and inferior capsule.     Strength/Myotome Testing     Right Shoulder     Planes of Motion   Flexion: 4   Extension: 5   Abduction: 4-   External rotation at 0°: 4   Internal rotation at 0°: 4     Isolated Muscles   Supraspinatus: 4- (pain)     Tests     Additional Tests Details  Pain with Charlton's Test R with internal rotation.  Painful arc  degrees R shoulder flexion      Issued, instructed in & performed home exercise program below:     Access Code: [email or web address]  URL: [email or web address]  Date: 11/29/2021  Prepared by: Ba Mac    Exercises  Supine Shoulder Flexion Extension AAROM with Dowel - 2 x daily - 7 x weekly - 1 sets - 10 reps  Supine Shoulder External Rotation with Dowel - 2 x daily - 7 x weekly - 1 sets - 10 reps  Standing Shoulder Abduction AAROM with Dowel - 2 x daily - 7 x weekly - 1 sets - 10 reps  Shoulder Scaption AAROM with Dowel - 2 x daily - 7 x weekly - 1 sets - 10 reps  Standing Shoulder Internal Rotation AAROM with Dowel - 2 x daily - 7 x weekly - 1 sets - 10 reps      Assessment & Plan     Assessment  Impairments: abnormal or restricted ROM, activity intolerance, impaired physical strength, lacks appropriate home exercise program and pain with function  Functional Limitations: carrying objects, lifting, uncomfortable because of pain, reaching behind back and reaching overhead  Assessment details: 55 yo male who presents with the impairments noted above  secondary to right shoulder pain, decreased shoulder mobility, decreased functional strength, postural dysfunction & shoulder capsular laxity.  These impairments decrease his ability and tolerance to perform his normal daily activities.  This patient presents with a level of complexity and his condition is such that the services required can be safely and effectively performed only by a qualified PT or supervised PTA.     Prognosis: fair    Goals  Plan Goals: STG (3 weeks)  1) independent in home program for basic shoulder range of motion  2) demos basic understanding of his condition and his role in treatment progression  3) tolerates initiation of right shoulder strengthening  4) demonstrates slight improvement in his tolerance to daily activities as evidenced by QuickDash score of 40% or less.      LTG (12 Weeks)  1) independent in home program for self-management of his condition  2) demonstrates AROM (R) shoulder flexion and abduction equal to 165 degrees or greater to allow for use of right upper extremity for reaching at or above head-height  3) demonstrates AROM (R) shoulder internal and external rotation at 90 degrees abduction equal to 80 degrees or greater to allow for use of right upper extremity for dressing and grooming  4) demonstrates significant improvement in his tolerance to daily activities as evidenced by QuickDash score of 25% or less.  5) MMT of right shoulder flexion/abduction/IR/ER equal to 4+/5 or greater to allow for ability to safely use right upper extremity for lifting small household and work objects.  6) reports improvement in tolerance to work activities as evidenced QuickDASH WORK score of 25% or less      Plan  Therapy options: will be seen for skilled therapy services  Planned modality interventions: cryotherapy, high voltage pulsed current (pain management), high voltage pulsed current (spasm management), ultrasound, thermotherapy (hydrocollator packs) and microcurrent electrical  stimulation  Planned therapy interventions: body mechanics training, flexibility, functional ROM exercises, home exercise program, IADL retraining, joint mobilization, strengthening, stretching, soft tissue mobilization, postural training, neuromuscular re-education and manual therapy  Frequency: 2x week  Duration in weeks: 10  Treatment plan discussed with: patient  Plan details: See for 6 visits then re-assess for continuation of Plan of Care as indicated by referring physician        History # of Personal Factors and/or Comorbidities: LOW (0)  Examination of Body System(s): # of elements: LOW (1-2)  Clinical Presentation: STABLE   Clinical Decision Making: LOW     Timed:         Manual Therapy:         mins  96444;     Therapeutic Exercise:    10     mins  23893;     Neuromuscular Lydia:        mins  32527;    Therapeutic Activity:          mins  24310;     Gait Training:           mins  97547;     Ultrasound:          mins  01418;    Ionto                                   mins   44133  Self Care                            mins   90536  Canalith Repos         mins 70069      Un-Timed:  Electrical Stimulation:         mins  27497 (MC );  Traction          mins 40775  Dry Needle                 ______ mins DRYNDL  Low Eval     35     Mins  73339  Mod Eval          Mins  17089  High Eval                            Mins  08521  Re-Eval                               mins  69001        Timed Treatment:  10    mins   Total Treatment:     45   mins    PT SIGNATURE: Ryan Mac, HAILEY   DATE TREATMENT INITIATED: 11/29/2021    Initial Certification  Certification Period: 11/29/2021 through 2/27/2022  I certify that the therapy services are furnished while this patient is under my care.  The services outlined above are required by this patient, and will be reviewed every 90 days.     PHYSICIAN: Clair Stock PA      DATE:     Please sign and return via fax to [phone]. Thank you, [facility].

## 2021-11-30 DIAGNOSIS — F98.8 ATTENTION DEFICIT DISORDER (ADD) WITHOUT HYPERACTIVITY: ICD-10-CM

## 2021-11-30 RX ORDER — DEXTROAMPHETAMINE SACCHARATE, AMPHETAMINE ASPARTATE, DEXTROAMPHETAMINE SULFATE AND AMPHETAMINE SULFATE 3.125; 3.125; 3.125; 3.125 MG/1; MG/1; MG/1; MG/1
12.5 TABLET ORAL 2 TIMES DAILY
Qty: 60 TABLET | Refills: 0 | Status: SHIPPED | OUTPATIENT
Start: 2021-11-30 | End: 2022-06-23 | Stop reason: SDUPTHER

## 2021-11-30 NOTE — TELEPHONE ENCOUNTER
Caller: Kendell Mccall    Relationship: Self    Best call back number: [phone] (H)    Requested Prescriptions:   Requested Prescriptions     Pending Prescriptions Disp Refills   • amphetamine-dextroamphetamine (Adderall) 12.5 MG tablet 60 tablet 0     Sig: Take 1 tablet by mouth 2 (Two) Times a Day.        Pharmacy where request should be sent: [facility]/PHARMACY #[id number] - [facility] [place] - [address] - [phone]  - [phone] FX     Additional details provided by patient: HAS ABOUT 5 DAYS LEFT     Does the patient have less than a 3 day supply:  [] Yes  [x] No    Kallie Coelho, CHANEL   11/30/21 09:15 EST

## 2021-12-03 ENCOUNTER — TREATMENT (OUTPATIENT)
Dept: PHYSICAL THERAPY | Facility: CLINIC | Age: 56
End: 2021-12-03

## 2021-12-03 DIAGNOSIS — S46.911A STRAIN OF RIGHT SHOULDER, INITIAL ENCOUNTER: ICD-10-CM

## 2021-12-03 DIAGNOSIS — S49.91XA INJURY OF RIGHT SHOULDER, INITIAL ENCOUNTER: Primary | ICD-10-CM

## 2021-12-03 PROCEDURE — 97530 THERAPEUTIC ACTIVITIES: CPT | Performed by: PHYSICAL THERAPIST

## 2021-12-03 PROCEDURE — 97110 THERAPEUTIC EXERCISES: CPT | Performed by: PHYSICAL THERAPIST

## 2021-12-03 NOTE — PROGRESS NOTES
Physical Therapy Daily Progress Note    Patient: Kendell Mccall   : 1965  Diagnosis/ICD-10 Code:  Injury of right shoulder, initial encounter [S49.91XA]  Referring practitioner: ANTONIO Gutiérrez  Date of Initial Visit: Type: THERAPY  Noted: 2021  Today's Date: 12/3/2021  Patient seen for 2 sessions           Subjective     Kendell Mccall reports: a little sore today, but did receive injection in his shoulder which may be contributing to this.  No complaints with home program.    Objective   See Exercise, Manual, and Modality Logs for complete treatment.     Active Range of Motion     Right Shoulder   Flexion: 80 degrees with pain  Abduction: 70 degrees with pain  External rotation 45°: 65 degrees      Passive Range of Motion      Right Shoulder   Flexion: WFL  Abduction: WFL  External rotation 45°: 70 degrees   Internal rotation 45°: WFL    Noted tightness in scapulothoracic joint so initiated manual therapy to increase scapulothoracic joint play and improve shoulder motion.    Progressed / advanced exercises as noted in flow sheets.    Access Code: [email or web address]  URL: [email or web address]  Date: 2021  Prepared by: Ba Mac    Exercises  Seated Shoulder Inferior Glide - 1 x daily - 7 x weekly - 1 sets - 3 reps - 20 sec hold  Standing Shoulder Posterior Capsule Stretch - 1 x daily - 7 x weekly - 1 sets - 3 reps - 20 sec hold      Assessment/Plan     Slight improvement in AROM today as compared to evaluation. Doing very well with PROM.  Still with pain and notable disparity between active and passive range of motion.  Is tolerating exercises well however.    Progress strengthening /stabilization /functional activity.  Work on gentle shoulder strengthening, emphasis on the rotator cuff.  Begin with isometrics and progress as tolerated.           Manual Therapy:    10     mins  34086;  Therapeutic Exercise:    30     mins  35305;     Neuromuscular Lydia:        mins  70831;    Therapeutic  Activity:          mins  98616;     Gait Training:           mins  97530;     Ultrasound:          mins  33259;    Electrical Stimulation:         mins  90765 ( );  Dry Needling          mins self-pay    Timed Treatment:   40   mins   Total Treatment:     40   mins    Ryan Mac PT  Physical Therapist

## 2021-12-07 ENCOUNTER — TREATMENT (OUTPATIENT)
Dept: PHYSICAL THERAPY | Facility: CLINIC | Age: 56
End: 2021-12-07

## 2021-12-07 DIAGNOSIS — S46.911A STRAIN OF RIGHT SHOULDER, INITIAL ENCOUNTER: ICD-10-CM

## 2021-12-07 DIAGNOSIS — S49.91XA INJURY OF RIGHT SHOULDER, INITIAL ENCOUNTER: Primary | ICD-10-CM

## 2021-12-07 PROCEDURE — 97530 THERAPEUTIC ACTIVITIES: CPT | Performed by: PHYSICAL THERAPIST

## 2021-12-07 NOTE — PROGRESS NOTES
Physical Therapy Daily Progress Note    Patient: Kendell Mccall   : 1965  Diagnosis/ICD-10 Code:  Injury of right shoulder, initial encounter [S49.91XA]  Referring practitioner: ANTONIO Gutiérrez  Date of Initial Visit: Type: THERAPY  Noted: 2021  Today's Date: 2021  Patient seen for 3 sessions           Subjective     Kendell Mccall reports: Continues with mild shoulder soreness.  Report he is to scheduled for MRI of his shoulder, but to continue PT in the interim.    Objective   See Exercise, Manual, and Modality Logs for complete treatment.     Active Range of Motion     Right Shoulder   Flexion: 80 degrees with pain  Abduction: 70 degrees with pain  External rotation 45°: 65 degrees      Passive Range of Motion      Right Shoulder   Flexion: WFL  Abduction: WFL  External rotation 45°: 70 degrees   Internal rotation 45°: WFL    Initiated scapular stabilization, posture and rotator cuff isometrics.    Revised, instructed in & performed home exercise program below:     Access Code: [email or web address]  URL: [email or web address]  Date: 2021  Prepared by: Ba Mac    Exercises  Supine Shoulder Flexion Extension AAROM with Dowel - 2 x daily - 7 x weekly - 1 sets - 10 reps  Supine Shoulder External Rotation with Dowel - 2 x daily - 7 x weekly - 1 sets - 10 reps  Standing Shoulder Abduction AAROM with Dowel - 2 x daily - 7 x weekly - 1 sets - 10 reps  Shoulder Scaption AAROM with Dowel - 2 x daily - 7 x weekly - 1 sets - 10 reps  Standing Bilateral Shoulder Internal Rotation AAROM with Dowel - 2 x daily - 7 x weekly - 1 sets - 10 reps  Standing Isometric Shoulder Internal Rotation with Towel Roll at Doorway - 1 x daily - 7 x weekly - 1 sets - 10 reps - 5 sec hold  Standing Isometric Shoulder External Rotation with Doorway and Towel Roll - 1 x daily - 7 x weekly - 1 sets - 10 reps - 5 sec hold  Standing Isometric Shoulder Flexion with Doorway - Arm Bent - 1 x daily - 7 x weekly - 1 sets - 10  reps - 5 sec hold  Standing Isometric Shoulder Abduction with Doorway - Arm Bent - 1 x daily - 7 x weekly - 1 sets - 10 reps - 5 sec hold  Standing Isometric Shoulder Extension with Doorway - Arm Bent - 1 x daily - 7 x weekly - 1 sets - 10 reps - 5 sec hold      Assessment/Plan     Continues with weakness with abduction and external rotation.  Did tolerate exercises today in clinic well.    Progress strengthening /stabilization /functional activity.  Work on gentle shoulder strengthening, emphasis on the rotator cuff.  Continue with isometrics and progress as tolerated.  Assess tolerance to home program.            Manual Therapy:    3     mins  62588;  Therapeutic Exercise:    38     mins  08004;     Neuromuscular Lydia:        mins  82851;    Therapeutic Activity:          mins  35947;     Gait Training:           mins  86782;     Ultrasound:          mins  07766;    Electrical Stimulation:         mins  44824 ( );  Dry Needling          mins self-pay    Timed Treatment:   41   mins   Total Treatment:     50   mins    Ryan Mac PT  Physical Therapist

## 2021-12-09 ENCOUNTER — TREATMENT (OUTPATIENT)
Dept: PHYSICAL THERAPY | Facility: CLINIC | Age: 56
End: 2021-12-09

## 2021-12-09 DIAGNOSIS — S49.91XA INJURY OF RIGHT SHOULDER, INITIAL ENCOUNTER: Primary | ICD-10-CM

## 2021-12-09 DIAGNOSIS — S46.911A STRAIN OF RIGHT SHOULDER, INITIAL ENCOUNTER: ICD-10-CM

## 2021-12-09 PROCEDURE — 97110 THERAPEUTIC EXERCISES: CPT | Performed by: PHYSICAL THERAPIST

## 2021-12-09 NOTE — PROGRESS NOTES
Physical Therapy Daily Progress Note    Patient: Kendell Mccall   : 1965  Diagnosis/ICD-10 Code:  Injury of right shoulder, initial encounter [S49.91XA]  Referring practitioner: ANTONIO Gutiérrez  Date of Initial Visit: Type: THERAPY  Noted: 2021  Today's Date: 2021  Patient seen for 4 sessions           Subjective     Kendell Mccall reports: Still waiting on MRI to be scheduled.  His shoulder is still about the same with pain during certain movements    Objective   See Exercise, Manual, and Modality Logs for complete treatment.     Active Range of Motion     Right Shoulder   Flexion: 80 degrees with pain  Abduction: 70 degrees with pain  External rotation 45°: 65 degrees      Passive Range of Motion      Right Shoulder   Flexion: WFL  Abduction: WFL  External rotation 45°: 70 degrees   Internal rotation 45°: WFL    Initiated scapular stabilization, posture and rotator cuff isometrics.    Revised, instructed in & performed home exercise program below:     Access Code: [email or web address]  URL: [email or web address]  Date: 2021  Prepared by: Ba Mac    Exercises  Supine Shoulder Flexion Extension AAROM with Dowel - 2 x daily - 7 x weekly - 1 sets - 10 reps  Supine Shoulder External Rotation with Dowel - 2 x daily - 7 x weekly - 1 sets - 10 reps  Standing Shoulder Abduction AAROM with Dowel - 2 x daily - 7 x weekly - 1 sets - 10 reps  Shoulder Scaption AAROM with Dowel - 2 x daily - 7 x weekly - 1 sets - 10 reps  Standing Bilateral Shoulder Internal Rotation AAROM with Dowel - 2 x daily - 7 x weekly - 1 sets - 10 reps  Standing Isometric Shoulder Internal Rotation with Towel Roll at Doorway - 1 x daily - 7 x weekly - 1 sets - 10 reps - 5 sec hold  Standing Isometric Shoulder External Rotation with Doorway and Towel Roll - 1 x daily - 7 x weekly - 1 sets - 10 reps - 5 sec hold  Standing Isometric Shoulder Flexion with Doorway - Arm Bent - 1 x daily - 7 x weekly - 1 sets - 10 reps - 5 sec  hold  Standing Isometric Shoulder Abduction with Doorway - Arm Bent - 1 x daily - 7 x weekly - 1 sets - 10 reps - 5 sec hold  Standing Isometric Shoulder Extension with Doorway - Arm Bent - 1 x daily - 7 x weekly - 1 sets - 10 reps - 5 sec hold      Assessment/Plan     Continues with weakness with abduction and external rotation.  Did tolerate exercises today in clinic well.      Progress strengthening /stabilization /functional activity.  Work on gentle shoulder strengthening, emphasis on the rotator cuff.  Continue with isometrics and progress as tolerated.  Assess tolerance to home program.            Manual Therapy:    3     mins  41185;  Therapeutic Exercise:   40     mins  61406;     Neuromuscular Lydia:        mins  11598;    Therapeutic Activity:          mins  39654;     Gait Training:           mins  33016;     Ultrasound:          mins  20850;    Electrical Stimulation:         mins  43259 ( );  Dry Needling          mins self-pay    Timed Treatment:   43  mins   Total Treatment:     50   mins    Ryan Mac PT  Physical Therapist

## 2021-12-13 ENCOUNTER — TREATMENT (OUTPATIENT)
Dept: PHYSICAL THERAPY | Facility: CLINIC | Age: 56
End: 2021-12-13

## 2021-12-13 DIAGNOSIS — S49.91XA INJURY OF RIGHT SHOULDER, INITIAL ENCOUNTER: Primary | ICD-10-CM

## 2021-12-13 DIAGNOSIS — S46.911A STRAIN OF RIGHT SHOULDER, INITIAL ENCOUNTER: ICD-10-CM

## 2021-12-13 PROCEDURE — 97110 THERAPEUTIC EXERCISES: CPT | Performed by: PHYSICAL THERAPIST

## 2021-12-13 NOTE — PROGRESS NOTES
Physical Therapy Daily Progress Note    Patient: Kendell Mccall   : 1965  Diagnosis/ICD-10 Code:  Injury of right shoulder, initial encounter [S49.91XA]  Referring practitioner: ANTONIO Gutiérrez  Date of Initial Visit: Type: THERAPY  Noted: 2021  Today's Date: 2021  Patient seen for 5 sessions           Subjective     Kendell Mccall reports:  Not much improvement as yet, shoulder is still about the same with pain during certain movements.  No complaints with home program.    Objective   See Exercise, Manual, and Modality Logs for complete treatment.     Active Range of Motion     Right Shoulder   Flexion: 75 degrees with pain  Abduction: 80 degrees with pain    Continued scapular stabilization, posture correction and rotator cuff isometrics.      Discontinued ultrasound due to lack of lasting effect.    Assessment/Plan     Continues with weakness with abduction and external rotation.  Did tolerate exercises today in clinic well.  No notable change in shoulder range of motion.    Progress strengthening /stabilization /functional activity.  Continue to work on gentle shoulder strengthening, emphasis on the rotator cuff.  Continue with isometrics and progress as tolerated.             Manual Therapy:         mins  05444;  Therapeutic Exercise:   45     mins  70270;     Neuromuscular Lydia:        mins  69711;    Therapeutic Activity:          mins  87011;     Gait Training:           mins  48121;     Ultrasound:          mins  53951;    Electrical Stimulation:         mins  81273 ( );  Dry Needling          mins self-pay    Timed Treatment:   43  mins   Total Treatment:     50   mins    Ryan Mac PT  Physical Therapist

## 2021-12-17 ENCOUNTER — TREATMENT (OUTPATIENT)
Dept: PHYSICAL THERAPY | Facility: CLINIC | Age: 56
End: 2021-12-17

## 2021-12-17 DIAGNOSIS — S46.911A STRAIN OF RIGHT SHOULDER, INITIAL ENCOUNTER: ICD-10-CM

## 2021-12-17 DIAGNOSIS — S49.91XA INJURY OF RIGHT SHOULDER, INITIAL ENCOUNTER: Primary | ICD-10-CM

## 2021-12-17 PROCEDURE — 97110 THERAPEUTIC EXERCISES: CPT | Performed by: PHYSICAL THERAPIST

## 2021-12-17 PROCEDURE — 97530 THERAPEUTIC ACTIVITIES: CPT | Performed by: PHYSICAL THERAPIST

## 2021-12-17 NOTE — PROGRESS NOTES
Re-Assessment / Progress Note    Patient: Kendell Mccall   : 1965  Diagnosis/ICD-10 Code:  Injury of right shoulder, initial encounter [S49.91XA]  Referring practitioner: ANTONIO Gutiérrez  Date of Initial Visit: Episode Type: THERAPY  Noted: 2021    Today's Date: 2021  Patient seen for 6 sessions.      Subjective:     Subjective Questionnaire: QuickDASH: 41%  Subjective Questionnaire: QuickDASH WORK: 81.25%  Clinical Progress: unchanged  Home Program Compliance: Yes  Treatment has included: therapeutic exercise, manual therapy, therapeutic activity and cryotherapy    Subjective     Kendell Mccall reports: He is scheduled for MRI on  for his shoulder.  He reports so far the shoulder is about the same in regards to pain levels and ability to use shoulder.    Pain  Current pain ratin  At best pain ratin  At worst pain ratin  Location: right anterior shoulder  Quality: sharp  Relieving factors: rest and ice  Aggravating factors: movement  Progression: no change         Objective     Active Range of Motion     Right Shoulder   Flexion: 70 degrees with pain  Abduction: 70 degrees with pain  External rotation 45°: 65 degrees      Passive Range of Motion      Right Shoulder   Flexion: WFL  Abduction: WFL  External rotation 45°: 70 degrees   Internal rotation 45°: WFL    Scapular Mobility      Right Shoulder   Scapular mobility: fair     Joint Play      Right Shoulder  Hypomobile in the posterior capsule and inferior capsule.      Strength/Myotome Testing     Right Shoulder      Planes of Motion   Flexion: 4   Extension: 5   Abduction: 4-   External rotation at 0°: 4+  Internal rotation at 0°: 4+     Isolated Muscles   Supraspinatus: 4- (pain)      Tests     Additional Tests Details  Pain with Talking Rock's Test R with internal rotation.  Painful arc  degrees R shoulder flexion        See Exercise, Manual, and Modality Logs for complete treatment     Assessment/Plan   Patient is tolerating  interventions well in clinic and has made slight improvement in shoulder AROM flex and abduction of about 10 degrees each.  He is still notable for painful arc with his shoulder motion and weakness and pain with MMT with shoulder flexion and abduction and with specific testing of his supraspinatus.   He also seems to have somewhat increased joint play in his shoulder capsule.    Progress toward previous goals: Partially Met    Goals    Short-term goals (STG): 3/4  Long-term goals (LTG): 0/6    STG (3 weeks)  1) independent in home program for basic shoulder range of motion (MET)  2) demos basic understanding of his condition and his role in treatment progression (MET)  3) tolerates initiation of right shoulder strengthening (MET)  4) demonstrates slight improvement in his tolerance to daily activities as evidenced by QuickDash score of 40% or less. (NOT MET)      LTG (12 Weeks)  1) independent in home program for self-management of his condition (NOT MET)  2) demonstrates AROM (R) shoulder flexion and abduction equal to 165 degrees or greater to allow for use of right upper extremity for reaching at or above head-height (NOT MET)  3) demonstrates AROM (R) shoulder internal and external rotation at 90 degrees abduction equal to 80 degrees or greater to allow for use of right upper extremity for dressing and grooming (NOT MET)  4) demonstrates significant improvement in his tolerance to daily activities as evidenced by QuickDash score of 25% or less. (NOT MET)  5) MMT of right shoulder flexion/abduction/IR/ER equal to 4+/5 or greater to allow for ability to safely use right upper extremity for lifting small household and work objects. (NOT MET)  6) reports improvement in tolerance to work activities as evidenced QuickDASH WORK score of 25% or less (NOT MET)        Recommendations: Continue as planned  Timeframe: 1 month - pending MRI results.  Prognosis to achieve goals: fair     PT Signature: Ryan Mac,  PT      Based upon review of the patient's progress and continued therapy plan, it is my medical opinion that  Kendell Mccall should continue physical therapy treatment at   [facility]  [address]  [place] [id number] [phone].    Please advise and sign as order as appropriate.   Thank you.      Signature: __________________________________  ANTONIO Gutiérrez        Timed:         Manual Therapy:         mins  55491;     Therapeutic Exercise:    30     mins  44030;     Neuromuscular Lydia:        mins  54445;    Therapeutic Activity:     10     mins  07175;     Gait Training:           mins  00918;     Ultrasound:          mins  71348;    Ionto                                   mins   05489  Self Care                            mins   80544        Un-Timed:  Electrical Stimulation:         mins  89336 ( );  Dry Needling          mins self-pay  Traction          mins 21116  Low Eval          Mins  28865  Mod Eval          Mins  73046  High Eval                            Mins  61448  Re-Eval                               mins  47094        Timed Treatment:   40   mins   Total Treatment:     40   mins

## 2021-12-19 RX ORDER — TRIAMTERENE AND HYDROCHLOROTHIAZIDE 37.5; 25 MG/1; MG/1
CAPSULE ORAL
Qty: 90 CAPSULE | Refills: 1 | Status: SHIPPED | OUTPATIENT
Start: 2021-12-19 | End: 2022-06-23 | Stop reason: SDUPTHER

## 2021-12-20 ENCOUNTER — TREATMENT (OUTPATIENT)
Dept: PHYSICAL THERAPY | Facility: CLINIC | Age: 56
End: 2021-12-20

## 2021-12-20 DIAGNOSIS — S46.911A STRAIN OF RIGHT SHOULDER, INITIAL ENCOUNTER: ICD-10-CM

## 2021-12-20 DIAGNOSIS — S49.91XA INJURY OF RIGHT SHOULDER, INITIAL ENCOUNTER: Primary | ICD-10-CM

## 2021-12-20 PROCEDURE — 97110 THERAPEUTIC EXERCISES: CPT | Performed by: PHYSICAL THERAPIST

## 2021-12-20 NOTE — PROGRESS NOTES
Physical Therapy Daily Progress Note    Patient: Kendell Mccall   : 1965  Diagnosis/ICD-10 Code:  Injury of right shoulder, initial encounter [S49.91XA]  Referring practitioner: ANTONIO Gutiérrez  Date of Initial Visit: Type: THERAPY  Noted: 2021  Today's Date: 2021  Patient seen for 7 sessions           Subjective     Kendell Mccall reports:  Doing about the same.  Does feel like light resistive exercises are helping his weakness.    Objective   See Exercise, Manual, and Modality Logs for complete treatment.     Active Range of Motion     Right Shoulder   Flexion: 75 degrees with pain  Abduction: 80 degrees with pain    Continued scapular stabilization, posture correction and rotator cuff isometrics.      Added some scapular stabilization/posture correction exercises.    Assessment/Plan     Continues with weakness with abduction and external rotation.  Tolerated scapular stabilization/posture correction exercises well.    Progress strengthening /stabilization /functional activity.  Continue to work on gentle shoulder strengthening, emphasis on the rotator cuff.  Continue with isometrics and progress as tolerated.             Manual Therapy:         mins  66467;  Therapeutic Exercise:   45     mins  47820;     Neuromuscular Lydia:        mins  13965;    Therapeutic Activity:          mins  02797;     Gait Training:           mins  73555;     Ultrasound:          mins  81850;    Electrical Stimulation:         mins  48995 ( );  Dry Needling          mins self-pay    Timed Treatment:   43  mins   Total Treatment:     50   mins    Ryan Mac PT  Physical Therapist

## 2022-01-27 ENCOUNTER — HOSPITAL ENCOUNTER (OUTPATIENT)
Dept: GENERAL RADIOLOGY | Facility: HOSPITAL | Age: 57
Discharge: HOME | End: 2022-01-27

## 2022-01-27 ENCOUNTER — HOSPITAL ENCOUNTER (OUTPATIENT)
Dept: CARDIOLOGY | Facility: HOSPITAL | Age: 57
Discharge: HOME | End: 2022-01-27

## 2022-01-27 ENCOUNTER — TRANSCRIBE ORDERS (OUTPATIENT)
Dept: ADMINISTRATIVE | Facility: HOSPITAL | Age: 57
End: 2022-01-27

## 2022-01-27 ENCOUNTER — LAB (OUTPATIENT)
Dept: LAB | Facility: HOSPITAL | Age: 57
End: 2022-01-27

## 2022-01-27 ENCOUNTER — TELEPHONE (OUTPATIENT)
Dept: ONCOLOGY | Facility: CLINIC | Age: 57
End: 2022-01-27

## 2022-01-27 DIAGNOSIS — Z01.818 PRE-OP TESTING: ICD-10-CM

## 2022-01-27 DIAGNOSIS — Z01.818 PRE-OP TESTING: Primary | ICD-10-CM

## 2022-01-27 LAB
ANION GAP SERPL CALCULATED.3IONS-SCNC: 7.3 MMOL/L (ref 5–15)
BACTERIA UR QL AUTO: NORMAL /HPF
BASOPHILS # BLD AUTO: 0.06 10*3/MM3 (ref 0–0.2)
BASOPHILS NFR BLD AUTO: 0.6 % (ref 0–1.5)
BILIRUB UR QL STRIP: NEGATIVE
BUN SERPL-MCNC: 17 MG/DL (ref 6–20)
BUN/CREAT SERPL: 17.3 (ref 7–25)
CALCIUM SPEC-SCNC: 9.6 MG/DL (ref 8.6–10.5)
CHLORIDE SERPL-SCNC: 98 MMOL/L (ref 98–107)
CLARITY UR: CLEAR
CO2 SERPL-SCNC: 30.7 MMOL/L (ref 22–29)
COLOR UR: YELLOW
CREAT SERPL-MCNC: 0.98 MG/DL (ref 0.76–1.27)
DEPRECATED RDW RBC AUTO: 40.6 FL (ref 37–54)
EOSINOPHIL # BLD AUTO: 0.17 10*3/MM3 (ref 0–0.4)
EOSINOPHIL NFR BLD AUTO: 1.7 % (ref 0.3–6.2)
ERYTHROCYTE [DISTWIDTH] IN BLOOD BY AUTOMATED COUNT: 12.4 % (ref 12.3–15.4)
GFR SERPL CREATININE-BSD FRML MDRD: 79 ML/MIN/1.73
GLUCOSE SERPL-MCNC: 103 MG/DL (ref 65–99)
GLUCOSE UR STRIP-MCNC: NEGATIVE MG/DL
HCT VFR BLD AUTO: 54.6 % (ref 37.5–51)
HGB BLD-MCNC: 18.4 G/DL (ref 13–17.7)
HGB UR QL STRIP.AUTO: NEGATIVE
HYALINE CASTS UR QL AUTO: NORMAL /LPF
IMM GRANULOCYTES # BLD AUTO: 0.08 10*3/MM3 (ref 0–0.05)
IMM GRANULOCYTES NFR BLD AUTO: 0.8 % (ref 0–0.5)
KETONES UR QL STRIP: NEGATIVE
LEUKOCYTE ESTERASE UR QL STRIP.AUTO: ABNORMAL
LYMPHOCYTES # BLD AUTO: 2.12 10*3/MM3 (ref 0.7–3.1)
LYMPHOCYTES NFR BLD AUTO: 21.8 % (ref 19.6–45.3)
MCH RBC QN AUTO: 30.6 PG (ref 26.6–33)
MCHC RBC AUTO-ENTMCNC: 33.7 G/DL (ref 31.5–35.7)
MCV RBC AUTO: 90.7 FL (ref 79–97)
MONOCYTES # BLD AUTO: 0.82 10*3/MM3 (ref 0.1–0.9)
MONOCYTES NFR BLD AUTO: 8.4 % (ref 5–12)
MRSA DNA SPEC QL NAA+PROBE: NORMAL
NEUTROPHILS NFR BLD AUTO: 6.49 10*3/MM3 (ref 1.7–7)
NEUTROPHILS NFR BLD AUTO: 66.7 % (ref 42.7–76)
NITRITE UR QL STRIP: NEGATIVE
NRBC BLD AUTO-RTO: 0 /100 WBC (ref 0–0.2)
PH UR STRIP.AUTO: 7.5 [PH] (ref 5–8)
PLATELET # BLD AUTO: 224 10*3/MM3 (ref 140–450)
PMV BLD AUTO: 11.6 FL (ref 6–12)
POTASSIUM SERPL-SCNC: 4.2 MMOL/L (ref 3.5–5.2)
PROT UR QL STRIP: NEGATIVE
RBC # BLD AUTO: 6.02 10*6/MM3 (ref 4.14–5.8)
RBC # UR STRIP: NORMAL /HPF
REF LAB TEST METHOD: NORMAL
SODIUM SERPL-SCNC: 136 MMOL/L (ref 136–145)
SP GR UR STRIP: 1.02 (ref 1–1.03)
SQUAMOUS #/AREA URNS HPF: NORMAL /HPF
UROBILINOGEN UR QL STRIP: ABNORMAL
WBC # UR STRIP: NORMAL /HPF
WBC NRBC COR # BLD: 9.74 10*3/MM3 (ref 3.4–10.8)

## 2022-01-27 PROCEDURE — 85025 COMPLETE CBC W/AUTO DIFF WBC: CPT

## 2022-01-27 PROCEDURE — 87641 MR-STAPH DNA AMP PROBE: CPT

## 2022-01-27 PROCEDURE — 71046 X-RAY EXAM CHEST 2 VIEWS: CPT

## 2022-01-27 PROCEDURE — 80048 BASIC METABOLIC PNL TOTAL CA: CPT

## 2022-01-27 PROCEDURE — 93005 ELECTROCARDIOGRAM TRACING: CPT | Performed by: ORTHOPAEDIC SURGERY

## 2022-01-27 PROCEDURE — 93010 ELECTROCARDIOGRAM REPORT: CPT | Performed by: INTERNAL MEDICINE

## 2022-01-27 PROCEDURE — 36415 COLL VENOUS BLD VENIPUNCTURE: CPT

## 2022-01-27 PROCEDURE — 81001 URINALYSIS AUTO W/SCOPE: CPT

## 2022-01-27 NOTE — TELEPHONE ENCOUNTER
Spoke with Dr. 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 She stated that Dr. Frank will tell patient and follow-up with lung nodule after surgery. I will set a reminder in [facility] database to follow-up.

## 2022-01-28 LAB — QT INTERVAL: 361 MS

## 2022-03-21 ENCOUNTER — TRANSCRIBE ORDERS (OUTPATIENT)
Dept: PHYSICAL THERAPY | Facility: CLINIC | Age: 57
End: 2022-03-21

## 2022-03-21 DIAGNOSIS — Z98.890 HX OF SHOULDER SURGERY: Primary | ICD-10-CM

## 2022-03-30 ENCOUNTER — TREATMENT (OUTPATIENT)
Dept: PHYSICAL THERAPY | Facility: CLINIC | Age: 57
End: 2022-03-30

## 2022-03-30 DIAGNOSIS — Z98.890 HX OF SHOULDER SURGERY: Primary | ICD-10-CM

## 2022-03-30 PROCEDURE — 97140 MANUAL THERAPY 1/> REGIONS: CPT | Performed by: PHYSICAL THERAPIST

## 2022-03-30 PROCEDURE — 97162 PT EVAL MOD COMPLEX 30 MIN: CPT | Performed by: PHYSICAL THERAPIST

## 2022-03-30 PROCEDURE — 97110 THERAPEUTIC EXERCISES: CPT | Performed by: PHYSICAL THERAPIST

## 2022-03-30 NOTE — PROGRESS NOTES
"Physical Therapy Initial Evaluation and Plan of Care    Patient:  Kendell Mccall   :  1965  Diagnosis/ICD-10 Code:  Hx of shoulder surgery [Z98.890]  Referring practitioner:  Ashu Frank MD  Date of Initial Visit:  3/30/2022  Today's Date:  3/30/2022  Patient seen for 1 session         Visit Diagnoses:      ICD-10-CM ICD-9-CM   1. Hx of shoulder surgery  Z98.890 V45.89       PRECAUTIONS:  None.    Subjective Questionnaire:  Quick Dash = 25% disability.    Subjective Evaluation    History of Present Illness  Mechanism of injury: PT referral for:  Z98.890 (ICD-10-CM) - Hx of shoulder surgery.    Pt was previously treated in this clinic for management of R shld pain 21-21 with no sig change in status of symptoms.  Per his last PT evaluation:  \"He reports that on 21, he fell onto a skid while trying to break off a board and fell onto his outstretched R arm and his right side.  He had immediate pain but was able to finish his immediate tasks and then he reported to his work.   He was sent to occupational health immediate care and was given x-rays and found to have shoulder strain and multiple contusions to the  R shoulder, hand, thigh and ribs.\"    After an MRI to reveal a large tear, he elected to have R shld RCR surgery on 22 without complications.  Initially after his surgery, he had PT at [facility] and [facility] in [place], but has decided to switch clinic locations and come to us now.  His last follow up with his MD went well and he is pleased with progress thus far.     Pt presents today with:  No complaints of pain at rest.  With certain mvmts, he has pain in the anterior/lateral R shld region.    No sling worn today as it has been d/c by MD.    Denies numbness/tingling or sharp/shooting pains down either UE.  Denies neck pain/dysfunction; therefore, per time this area will not be assessed today; will assess in a subsequent session PRN.    Functional deficits:  Difficulty with " washing hair.    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  Not currently working; awaiting MD approval once functional progress is appropriate to RTW.    Denies changes in bowel/bladder function, dizziness/vision issues, or other systemic problems since onset of symptoms.    Quality of life: good    Pain  Current pain ratin  At worst pain rating: 3  Relieving factors: change in position, relaxation, ice, rest and medications    Hand dominance: right           Vitals in sitting after subjective hx-taking in LUE:  BP:  132/76 mmHg.  HR:  81 bpm.  O2:  100%.    Objective          Static Posture     Head  Forward.    Observations     Additional Shoulder Observation Details  Incision:  Well-healed.  No redness, warmth, drainage, or other signs of infection noted.    Palpation     Right   No palpable tenderness to the anterior deltoid, biceps, infraspinatus, middle deltoid, posterior deltoid, supraspinatus, triceps and upper trapezius.   Hypertonic in the upper trapezius.     Neurological Testing     Sensation   Cervical/Thoracic   Left   Intact: light touch    Right   Intact: light touch    Shoulder   Left Shoulder   Intact: light touch    Right Shoulder   Intact: light touch    Reflexes   Left   Regan's reflex: negative    Right   Regan's reflex: negative    Additional Neurological Details  Coordination intact via alternating UE forearm pronation/supination with hands in lap.  Coordination intact via alternating UE forearm pronation/supination with hands in lap.    Active Range of Motion   Left Shoulder   Flexion: WFL  Abduction: WFL    Right Shoulder   Flexion: Right shoulder active forward flexion: not measured per post-op status.   Abduction: Right shoulder active abduction: not measured per post-op status.     Additional Active Range of Motion Details  Sitting.    Passive Range of Motion     Right Shoulder   Flexion: 124 degrees   Abduction: 125 degrees    External rotation 45°: 58 degrees   Internal rotation 45°: 53 degrees     Additional Passive Range of Motion Details  Supine; no pain.    Strength/Myotome Testing     Left Shoulder     Planes of Motion   Flexion: 4-   Abduction: 4+     Right Shoulder     Planes of Motion   Right shoulder forward flexion strength: not measured per post-op status.   Right shoulder abduction strength: not measured per post-op status.   Right shoulder external rotation strength at 0 degrees: not measured per post-op status.   Right shoulder internal rotation strength at 0 degrees: not measured per post-op status.     Left Elbow   Flexion: 5  Extension: 5    Right Elbow   Flexion: 5  Extension: 5    Left Wrist/Hand   Wrist extension: 5    Right Wrist/Hand   Wrist extension: 5    Additional Strength Details  Finger flex and finger abd/add MMT:  5/5 klarissa.  MMT completed in available range in sitting.        Assessment & Plan     Assessment    Assessment details: PT referral for:  Z98.890 (ICD-10-CM) - Hx of shoulder surgery.    Pt presents today with the following impairments:  --  Subjective questionnaire score (Quick DASH).  --  R>LUE strength.  --  R shld mobility.  --  Hypertonicity with palpation.  --  Healing incision.  --  Posture.  --  Bed mobility with inability to fully bear weight on RUE with transfers.    The above impairments contribute to the following functional deficits:  Difficulty with washing hair.  Unable to work.    The pt would benefit from skilled PT sessions to address impairments noted above in order to improve functional mobility and quality of life.    The pt tolerated tests/measures during the initial evaluation without problems.  Skilled PT treatment was also initiated today to include manual and there-ex with distribution and explanation of an HEP handout for all exercises performed today.  No pain with manual stretching or exercises today.  No complications noted during today's session.  Prognosis:  good  Prognosis details: Potential barriers to therapy that may impede prognosis:  Prior PT treatment on surgical area with no sig change in status of pt's symptoms with therapy, complex PMHx.    Goals  Plan Goals: STGs to be met in 4 weeks:  --  Require </= 3 cues with HEP performance.  --  Pain levels at worst </= 2/10.  --  R shld PROM flex/abd in supine to >/= 145 degrees without pain.  --  R shld AROM flex/abd in sitting to >/= 145 degrees without pain.  --  Pt will be able to wash his hair with his RUE without difficulty.    LTGs to be met by end of POC:  --  Require no cues with HEP performance for d/c planning.  --  Pain levels at worst </= 2/10.  --  R shld PROM flex/abd in supine to >/= 170 degrees without pain.  --  R shld PROM ER (in scaption) in supine to >/= 70 degrees without pain.  --  R shld AROM flex/abd in sitting to >/= 170 degrees without pain.  --  R shld flex, abd, IR, ER MMT in sitting to 5/5 without pain.  --  Quick DASH score </= 10% disability.  --  Return to work per MD approval with min increase in pain with functional work tasks.    Plan  Therapy options: will be seen for skilled therapy services  Planned modality interventions: cryotherapy, dry needling, electrical stimulation/Russian stimulation, TENS, thermotherapy (hydrocollator packs) and ultrasound  Planned therapy interventions: abdominal trunk stabilization, ADL retraining, balance/weight-bearing training, flexibility, functional ROM exercises, home exercise program, joint mobilization, manual therapy, neuromuscular re-education, soft tissue mobilization, spinal/joint mobilization, strengthening, stretching and therapeutic activities  Frequency: 2x week  Duration in weeks: 13  Treatment plan discussed with: patient  Plan details: Clinic coordinator called referring provider's office to obtain protocol; no protocol available for this physician.        History # of Personal Factors and/or Comorbidities: MODERATE (1-2)  Examination  of Body System(s): # of elements: MODERATE (3)  Clinical Presentation: EVOLVING  Clinical Decision Making: MODERATE      Timed:  Manual Therapy:    8     mins  79415;  Therapeutic Exercise:    13     mins  48745;        Un-Timed:  Mod Eval     19     Mins  92133      Timed Treatment:   21   mins   Total Treatment:     40   mins      PT SIGNATURE:  Chepe Cheng PT   [place] PT License #:  [id number]  DATE TREATMENT INITIATED:  3/30/2022    90 Day Recertification  Certification Period: 3/30/2022 thru 6/27/2022  I certify that the therapy services are furnished while this patient is under my care.  The services outlined above are required by this patient, and will be reviewed every 90 days.      Physician Signature: ________________________________________    PHYSICIAN: Ashu Frank MD        DATE: ____________________________________________________    Please sign and return via fax to [phone]. Thank you, [facility].

## 2022-04-05 ENCOUNTER — TREATMENT (OUTPATIENT)
Dept: PHYSICAL THERAPY | Facility: CLINIC | Age: 57
End: 2022-04-05

## 2022-04-05 DIAGNOSIS — S49.91XA INJURY OF RIGHT SHOULDER, INITIAL ENCOUNTER: ICD-10-CM

## 2022-04-05 DIAGNOSIS — S46.911A STRAIN OF RIGHT SHOULDER, INITIAL ENCOUNTER: ICD-10-CM

## 2022-04-05 DIAGNOSIS — Z98.890 HX OF SHOULDER SURGERY: Primary | ICD-10-CM

## 2022-04-05 PROCEDURE — 97110 THERAPEUTIC EXERCISES: CPT | Performed by: PHYSICAL THERAPIST

## 2022-04-05 PROCEDURE — 97140 MANUAL THERAPY 1/> REGIONS: CPT | Performed by: PHYSICAL THERAPIST

## 2022-04-05 NOTE — PROGRESS NOTES
Physical Therapy Daily Progress Note        Patient: Kendell Mccall   : 1965  Diagnosis/ICD-10 Code:  Hx of shoulder surgery [Z98.890]  Referring practitioner: Ashu Frank MD  Date of Initial Visit: Type: THERAPY  Noted: 3/30/2022  Today's Date: 2022  Patient seen for 2 sessions    DATE 2022 2022 2022 2022 3/4/2022 3/11/2022 3/18/2022 3/25/2022 2022 2022   POST OP WEEK 0 1 2 3 4 5 6 7 8 9   DATE 4/15/2022 2022 2022 2022 2022 2022 2022 6/3/2022 6/10/2022 2022   POST OP WEEK 10 11 12 13 14 15 16 17 18 19   DATE 2022 2022 2022 7/15/2022 2022 2022 2022 2022 2022 2022   POST OP WEEK 20 21 22 23 24 25 26 27 28 29         Post OP week 8 as of 2022                 Subjective     Kendell Mccall reports: Shoulder is actually feeling OK, some minimal discomfort and only has significant pain with motion.          Objective   See Exercise, Manual, and Modality Logs for complete treatment.     Passive Range of Motion      Right Shoulder   Flexion: 130 degrees   Abduction: 125 degrees   External rotation 45°: 58 degrees   Internal rotation 45°: 53 degrees       Assessment/Plan    Progressing well and in accordance with expectations.  Minimal pain, improved range of motion.           Manual Therapy:   10     mins  05101;  Therapeutic Exercise:    25     mins  47043;     Neuromuscular Lydia:        mins  63203;    Therapeutic Activity:          mins  89291;     Gait Training:           mins  62301;     Ultrasound:          mins  86948;    Electrical Stimulation:         mins  36439 ( );  Dry Needling          mins self-pay    Timed Treatment:  35    mins   Total Treatment:     35   mins    Ryan Mac PT  Physical Therapist

## 2022-04-07 ENCOUNTER — TREATMENT (OUTPATIENT)
Dept: PHYSICAL THERAPY | Facility: CLINIC | Age: 57
End: 2022-04-07

## 2022-04-07 DIAGNOSIS — S46.911A STRAIN OF RIGHT SHOULDER, INITIAL ENCOUNTER: ICD-10-CM

## 2022-04-07 DIAGNOSIS — Z98.890 S/P ROTATOR CUFF REPAIR: ICD-10-CM

## 2022-04-07 DIAGNOSIS — Z98.890 HX OF SHOULDER SURGERY: Primary | ICD-10-CM

## 2022-04-07 DIAGNOSIS — S49.91XA INJURY OF RIGHT SHOULDER, INITIAL ENCOUNTER: ICD-10-CM

## 2022-04-07 PROCEDURE — 97140 MANUAL THERAPY 1/> REGIONS: CPT | Performed by: PHYSICAL THERAPIST

## 2022-04-07 PROCEDURE — 97110 THERAPEUTIC EXERCISES: CPT | Performed by: PHYSICAL THERAPIST

## 2022-04-07 NOTE — PROGRESS NOTES
Physical Therapy Daily Progress Note        Patient: Kendell Mccall   : 1965  Diagnosis/ICD-10 Code:  Hx of shoulder surgery [Z98.890]  Referring practitioner: Ashu Frank MD  Date of Initial Visit: Type: THERAPY  Noted: 3/30/2022  Today's Date: 2022  Patient seen for 3 sessions    DATE 2022 2022 2022 2022 3/4/2022 3/11/2022 3/18/2022 3/25/2022 2022 2022   POST OP WEEK 0 1 2 3 4 5 6 7 8 9   DATE 4/15/2022 2022 2022 2022 2022 2022 2022 6/3/2022 6/10/2022 2022   POST OP WEEK 10 11 12 13 14 15 16 17 18 19   DATE 2022 2022 2022 7/15/2022 2022 2022 2022 2022 2022 2022   POST OP WEEK 20 21 22 23 24 25 26 27 28 29     Post OP week 8 as of 2022           Subjective   Kendell Mccall reports: He continues to do well with home program and his pain is still not too bad    Objective    Passive Range of Motion      Right Shoulder   Flexion: 145 degrees   Abduction: 135 degrees   External rotation 45°: 65 degrees   Internal rotation 45°: 65 degrees     Initiated shoulder isometrics for  therapeutic exercises and home program.      Issued, instructed in & performed home exercise program below:     Access Code: [email or web address]  URL: [email or web address]  Date: 2022  Prepared by: Ba Mac    Exercises  Standing Shoulder Abduction AAROM with Dowel - 1 x daily - 7 x weekly - 1 sets - 10 reps  Supine Shoulder Flexion Extension AAROM with Dowel - 1 x daily - 7 x weekly - 1 sets - 10 reps  Supine Shoulder External Rotation in 45 Degrees Abduction AAROM with Dowel - 1 x daily - 7 x weekly - 1 sets - 10 reps  Seated Scapular Retraction - 1 x daily - 7 x weekly - 1 sets - 10 reps - 5 sec hold  Standing Isometric Shoulder Flexion with Doorway - Arm Bent - 1 x daily - 7 x weekly - 1 sets - 10 reps - 5 sec hold  Standing Isometric Shoulder Abduction with Doorway - Arm Bent - 1 x daily - 7 x weekly - 1 sets  - 10 reps - 5 sec hold  Standing Isometric Shoulder External Rotation with Doorway - 1 x daily - 7 x weekly - 1 sets - 10 reps - 5 sec hold  Standing Isometric Shoulder Internal Rotation at Doorway - 1 x daily - 7 x weekly - 1 sets - 10 reps - 5 sec hold  Standing Isometric Shoulder Extension with Doorway - Arm Bent - 1 x daily - 7 x weekly - 1 sets - 10 reps - 5 sec hold        Objective   See Exercise, Manual, and Modality Logs for complete treatment.       Assessment/Plan   Continues to do very well with range of motion progression and tolerated initiation of isometrics well.    Progress in accordance with typical shoulder protocols.  Assess effect of interventions added today.           Manual Therapy:    10     mins  72408;  Therapeutic Exercise:    32     mins  54156;     Neuromuscular Lydia:        mins  39059;    Therapeutic Activity:          mins  85667;     Gait Training:           mins  96948;     Ultrasound:          mins  32137;    Electrical Stimulation:         mins  99345 ( );  Dry Needling          mins self-pay    Timed Treatment:   42   mins   Total Treatment:     42   mins    Ryan Mac PT  Physical Therapist

## 2022-04-12 ENCOUNTER — TREATMENT (OUTPATIENT)
Dept: PHYSICAL THERAPY | Facility: CLINIC | Age: 57
End: 2022-04-12

## 2022-04-12 DIAGNOSIS — Z98.890 S/P ROTATOR CUFF REPAIR: ICD-10-CM

## 2022-04-12 DIAGNOSIS — Z98.890 HX OF SHOULDER SURGERY: Primary | ICD-10-CM

## 2022-04-12 DIAGNOSIS — S49.91XA INJURY OF RIGHT SHOULDER, INITIAL ENCOUNTER: ICD-10-CM

## 2022-04-12 DIAGNOSIS — S46.911A STRAIN OF RIGHT SHOULDER, INITIAL ENCOUNTER: ICD-10-CM

## 2022-04-12 PROCEDURE — 97110 THERAPEUTIC EXERCISES: CPT | Performed by: PHYSICAL THERAPIST

## 2022-04-12 NOTE — PROGRESS NOTES
Physical Therapy Daily Progress Note        Patient: Kendell Mccall   : 1965  Diagnosis/ICD-10 Code:  Hx of shoulder surgery [Z98.890]  Referring practitioner: Ashu Frank MD  Date of Initial Visit: Type: THERAPY  Noted: 3/30/2022  Today's Date: 2022  Patient seen for 4 sessions    DATE 2022 2022 2022 2022 3/4/2022 3/11/2022 3/18/2022 3/25/2022 2022 2022   POST OP WEEK 0 1 2 3 4 5 6 7 8 9   DATE 4/15/2022 2022 2022 2022 2022 2022 2022 6/3/2022 6/10/2022 2022   POST OP WEEK 10 11 12 13 14 15 16 17 18 19   DATE 2022 2022 2022 7/15/2022 2022 2022 20222022   POST OP WEEK 20 21 22 23 24 25 26 27 28 29     Post OP week 10 as of 2022           Subjective   Kendell Mccall reports: Doing well, minimal soreness.  Does think shoulder is weak but did tolerate isometrics well at home.      Objective   See Exercise, Manual, and Modality Logs for complete treatment.     Added prone shoulder rows.    Passive Range of Motion      Right Shoulder   Flexion: 175 degrees   Abduction: 175 degrees   External rotation 90°: 85 degrees   Internal rotation 90°: 85 degrees     Held manual therapy as joint play is now WFL.        Assessment/Plan   Doing very well with PROM with shoulder now WFL.   Tolerating isometric strengthening well.  Appears ready for early strengthening next visit.    Progress into early strengthening as appropriate.           Manual Therapy:         mins  74775;  Therapeutic Exercise:    35     mins  80814;     Neuromuscular Lydia:        mins  96622;    Therapeutic Activity:          mins  40184;     Gait Training:           mins  65344;     Ultrasound:          mins  64481;    Electrical Stimulation:         mins  49765 ( );  Dry Needling          mins self-pay    Timed Treatment:   35   mins   Total Treatment:     35   mins    Ryan Bubba, PT  Physical  Therapist

## 2022-04-14 ENCOUNTER — TREATMENT (OUTPATIENT)
Dept: PHYSICAL THERAPY | Facility: CLINIC | Age: 57
End: 2022-04-14

## 2022-04-14 DIAGNOSIS — S49.91XA INJURY OF RIGHT SHOULDER, INITIAL ENCOUNTER: ICD-10-CM

## 2022-04-14 DIAGNOSIS — Z98.890 S/P ROTATOR CUFF REPAIR: ICD-10-CM

## 2022-04-14 DIAGNOSIS — S46.911A STRAIN OF RIGHT SHOULDER, INITIAL ENCOUNTER: ICD-10-CM

## 2022-04-14 DIAGNOSIS — Z98.890 HX OF SHOULDER SURGERY: Primary | ICD-10-CM

## 2022-04-14 PROCEDURE — 97110 THERAPEUTIC EXERCISES: CPT | Performed by: PHYSICAL THERAPIST

## 2022-04-14 NOTE — PROGRESS NOTES
Physical Therapy Progress Note    Patient: Kendell Mccall   : 1965  Diagnosis/ICD-10 Code:  Hx of shoulder surgery [Z98.890]  Referring practitioner: Ashu Frank MD  Date of Initial Visit: Type: THERAPY  Noted: 3/30/2022  Today's Date: 2022  Patient seen for 5 sessions    DATE 2022 2022 2022 2022 3/4/2022 3/11/2022 3/18/2022 3/25/2022 2022 2022   POST OP WEEK 0 1 2 3 4 5 6 7 8 9   DATE 4/15/2022 2022 2022 2022 2022 2022 2022 6/3/2022 6/10/2022 2022   POST OP WEEK 10 11 12 13 14 15 16 17 18 19   DATE 2022 2022 2022 7/15/2022 2022 2022 2022 2022 2022 2022   POST OP WEEK 20 21 22 23 24 25 26 27 28 29     Post OP week 10 as of 2022           Subjective   Kendell Mccall reports: Shoulder is mostly pain-free other than minor aching.  He feels like he is doing fairly well at this point.      Objective          Strength/Myotome Testing     Right Shoulder     Planes of Motion   Flexion: 4-   Abduction: 4-   External rotation at 0°: 4-   Internal rotation at 0°: 4+     Isolated Muscles   Teres minor: 3+       See Exercise, Manual, and Modality Logs for complete treatment.     Passive Range of Motion      Right Shoulder   Flexion: 175 degrees   Abduction: 175 degrees   External rotation 90°: 85 degrees   Internal rotation 90°: 85 degrees         Assessment/Plan   Doing well thus far with passive range of motion WFL and good tolerance to isometrics and introduction to light strengthening.  Still needs work on strength and stability as expected.      Assessment    Assessment details: PT referral for:  Z98.890 (ICD-10-CM) - Hx of shoulder surgery.    Pt presents today with the following impairments:  --  Subjective questionnaire score (Quick DASH).  --  R>LUE strength.  --  Posture.  --  Bed mobility with inability to fully bear weight on RUE with transfers.    The above impairments contribute to the  following functional deficits:  Difficulty with washing hair.  Unable to work.    The pt would benefit from continued skilled PT sessions to address impairments noted above in order to improve functional mobility and quality of life.    Prognosis: good    Goals  Plan Goals: STGs to be met in 4 weeks:  --  Require </= 3 cues with HEP performance. (MET)   --  Pain levels at worst </= 2/10. (NOT MET)   --  R shld PROM flex/abd in supine to >/= 145 degrees without pain. (MET)   --  R shld AROM flex/abd in sitting to >/= 145 degrees without pain. (MET)   --  Pt will be able to wash his hair with his RUE without difficulty. (MET)     LTGs to be met by end of POC:  --  Require no cues with HEP performance for d/c planning. (NOT MET)   --  Pain levels at worst </= 2/10.  (NOT MET)   --  R shld PROM flex/abd in supine to >/= 170 degrees without pain. (MET)   --  R shld PROM ER (in scaption) in supine to >/= 70 degrees without pain. (MET)   --  R shld AROM flex/abd in sitting to >/= 170 degrees without pain. (MET)   --  R shld flex, abd, IR, ER MMT in sitting to 5/5 without pain. (NOT MET)   --  Quick DASH score </= 10% disability. (NOT MET)   --  Return to work per MD approval with min increase in pain with functional work  (NOT MET)     Plan:  Continue progression into shoulder strengthening.       Manual Therapy:         mins  29967;  Therapeutic Exercise:    35     mins  80747;     Neuromuscular Lydia:        mins  94346;    Therapeutic Activity:          mins  11589;     Gait Training:           mins  13999;     Ultrasound:          mins  37220;    Electrical Stimulation:         mins  20183 ( );  Dry Needling          mins self-pay    Timed Treatment:   35   mins   Total Treatment:     35   mins    Ryan Mac, PT  Physical Therapist

## 2022-04-19 ENCOUNTER — TREATMENT (OUTPATIENT)
Dept: PHYSICAL THERAPY | Facility: CLINIC | Age: 57
End: 2022-04-19

## 2022-04-19 DIAGNOSIS — Z98.890 HX OF SHOULDER SURGERY: Primary | ICD-10-CM

## 2022-04-19 DIAGNOSIS — S49.91XA INJURY OF RIGHT SHOULDER, INITIAL ENCOUNTER: ICD-10-CM

## 2022-04-19 DIAGNOSIS — S46.911A STRAIN OF RIGHT SHOULDER, INITIAL ENCOUNTER: ICD-10-CM

## 2022-04-19 DIAGNOSIS — Z98.890 S/P ROTATOR CUFF REPAIR: ICD-10-CM

## 2022-04-19 PROCEDURE — 97530 THERAPEUTIC ACTIVITIES: CPT | Performed by: PHYSICAL THERAPIST

## 2022-04-19 PROCEDURE — 97110 THERAPEUTIC EXERCISES: CPT | Performed by: PHYSICAL THERAPIST

## 2022-04-19 NOTE — PROGRESS NOTES
Physical Therapy Daily Progress Note        Patient: Kendell Mccall   : 1965  Diagnosis/ICD-10 Code:  Hx of shoulder surgery [Z98.890]  Referring practitioner: Ashu Frank MD  Date of Initial Visit: Type: THERAPY  Noted: 3/30/2022  Today's Date: 2022  Patient seen for 6 sessions    DATE 2022 2022 2022 2022 3/4/2022 3/11/2022 3/18/2022 3/25/2022 2022 2022   POST OP WEEK 0 1 2 3 4 5 6 7 8 9   DATE 4/15/2022 2022 2022 2022 2022 2022 2022 6/3/2022 6/10/2022 2022   POST OP WEEK 10 11 12 13 14 15 16 17 18 19   DATE 2022 2022 2022 7/15/2022 2022 2022 20222022   POST OP WEEK 20 21 22 23 24 25 26 27 28 29     Post OP week 10 as of 2022           Subjective   Kendell Mccall reports: Doing well, minimal soreness.  Saw MD and was told he was progressing as expected.        Objective   See Exercise, Manual, and Modality Logs for complete treatment.     Passive Range of Motion      Right Shoulder   Flexion: 175 degrees   Abduction: 175 degrees   External rotation 90°: 85 degrees   Internal rotation 90°: 85 degrees     Initiated light strengthening in prone and light shoulder stabilization exercises.      Assessment/Plan   Doing very well with PROM with shoulder now WFL.   Tolerated initiation of light  strengthening well.      Progress home program as appropriate.           Manual Therapy:         mins  98951;  Therapeutic Exercise:    35     mins  94230;     Neuromuscular Lydia:        mins  14346;    Therapeutic Activity:     10     mins  04349;     Gait Training:           mins  01260;     Ultrasound:          mins  31330;    Electrical Stimulation:         mins  18242 ( );  Dry Needling          mins self-pay    Timed Treatment:   45   mins   Total Treatment:     45   mins    Ryan Mac PT  Physical Therapist

## 2022-04-20 DIAGNOSIS — Z12.11 COLON CANCER SCREENING: Primary | ICD-10-CM

## 2022-04-21 ENCOUNTER — TREATMENT (OUTPATIENT)
Dept: PHYSICAL THERAPY | Facility: CLINIC | Age: 57
End: 2022-04-21

## 2022-04-21 DIAGNOSIS — Z98.890 S/P ROTATOR CUFF REPAIR: ICD-10-CM

## 2022-04-21 DIAGNOSIS — S49.91XA INJURY OF RIGHT SHOULDER, INITIAL ENCOUNTER: ICD-10-CM

## 2022-04-21 DIAGNOSIS — S46.911A STRAIN OF RIGHT SHOULDER, INITIAL ENCOUNTER: ICD-10-CM

## 2022-04-21 DIAGNOSIS — Z98.890 HX OF SHOULDER SURGERY: Primary | ICD-10-CM

## 2022-04-21 PROCEDURE — 97530 THERAPEUTIC ACTIVITIES: CPT | Performed by: PHYSICAL THERAPIST

## 2022-04-21 PROCEDURE — 97110 THERAPEUTIC EXERCISES: CPT | Performed by: PHYSICAL THERAPIST

## 2022-04-21 NOTE — PROGRESS NOTES
Physical Therapy Daily Progress Note        Patient: Kendell Mccall   : 1965  Diagnosis/ICD-10 Code:  Hx of shoulder surgery [Z98.890]  Referring practitioner: Ashu Frank MD  Date of Initial Visit: Type: THERAPY  Noted: 3/30/2022  Today's Date: 2022  Patient seen for 7 sessions    DATE 2022 2022 2022 2022 3/4/2022 3/11/2022 3/18/2022 3/25/2022 2022 2022   POST OP WEEK 0 1 2 3 4 5 6 7 8 9   DATE 4/15/2022 2022 2022 2022 2022 2022 2022 6/3/2022 6/10/2022 2022   POST OP WEEK 10 11 12 13 14 15 16 17 18 19   DATE 2022 2022 2022 7/15/2022 2022 2022 20222022   POST OP WEEK 20 21 22 23 24 25 26 27 28 29     Post OP week 10 as of 2022           Subjective   Kendell Mccall reports: Continues to do very well in regards to pain.  No issues with home program.      Objective   See Exercise, Manual, and Modality Logs for complete treatment.     Passive Range of Motion      Right Shoulder   Flexion: 175 degrees   Abduction: 175 degrees   External rotation 90°: 85 degrees   Internal rotation 90°: 85 degrees     Initiated light strengthening in prone and light shoulder stabilization exercises.    Issued, instructed in & performed home exercise program below:    Access Code: [email or web address]  URL: [email or web address]  Date: 2022  Prepared by: Ba Mac    Exercises  Standing Shoulder Abduction AAROM with Dowel - 1 x daily - 7 x weekly - 1 sets - 10 reps  Supine Shoulder Flexion Extension AAROM with Dowel - 1 x daily - 7 x weekly - 1 sets - 10 reps  Supine Shoulder External Rotation in 45 Degrees Abduction AAROM with Dowel - 2 x daily - 7 x weekly - 1 sets - 10 reps  Prone Shoulder Row - 2 x daily - 7 x weekly - 1 sets - 15 reps  Prone Shoulder Extension - Single Arm - 2 x daily - 7 x weekly - 1 sets - 15 reps  Prone Shoulder Horizontal Abduction - 2 x daily - 7 x weekly - 1 sets - 15  reps  Prone Single Arm Shoulder Y - 2 x daily - 7 x weekly - 1 sets - 15 reps  Sidelying Shoulder ER with Towel and Dumbbell - 2 x daily - 7 x weekly - 1 sets - 15 reps  Shoulder Flexion Wall Slide with Towel - 2 x daily - 7 x weekly - 1 sets - 10 reps      Assessment/Plan   Progressing very well and good tolerance to light strengthening and stabilization.    Progress in accordance with post surgical guidelines and patient tolerance.           Manual Therapy:         mins  77769;  Therapeutic Exercise:    35     mins  03293;     Neuromuscular Lydia:        mins  16795;    Therapeutic Activity:     10     mins  14839;     Gait Training:           mins  83112;     Ultrasound:          mins  95083;    Electrical Stimulation:         mins  89882 ( );  Dry Needling          mins self-pay    Timed Treatment:   45   mins   Total Treatment:     45   mins    Ryan Mac PT  Physical Therapist

## 2022-04-26 ENCOUNTER — TREATMENT (OUTPATIENT)
Dept: PHYSICAL THERAPY | Facility: CLINIC | Age: 57
End: 2022-04-26

## 2022-04-26 DIAGNOSIS — Z98.890 HX OF SHOULDER SURGERY: Primary | ICD-10-CM

## 2022-04-26 DIAGNOSIS — S46.911A STRAIN OF RIGHT SHOULDER, INITIAL ENCOUNTER: ICD-10-CM

## 2022-04-26 DIAGNOSIS — Z98.890 S/P ROTATOR CUFF REPAIR: ICD-10-CM

## 2022-04-26 DIAGNOSIS — S49.91XA INJURY OF RIGHT SHOULDER, INITIAL ENCOUNTER: ICD-10-CM

## 2022-04-26 PROCEDURE — 97530 THERAPEUTIC ACTIVITIES: CPT | Performed by: PHYSICAL THERAPIST

## 2022-04-26 PROCEDURE — 97110 THERAPEUTIC EXERCISES: CPT | Performed by: PHYSICAL THERAPIST

## 2022-04-26 NOTE — PROGRESS NOTES
Physical Therapy Daily Progress Note        Patient: Kendell Mccall   : 1965  Diagnosis/ICD-10 Code:  Hx of shoulder surgery [Z98.890]  Referring practitioner: Ashu Frank MD  Date of Initial Visit: No linked episodes  Today's Date: 2022  Patient seen for Visit count could not be calculated. Make sure you are using a visit which is associated with an episode. sessions    DATE 2022 2022 2022 2022 3/4/2022 3/11/2022 3/18/2022 3/25/2022 2022 2022   POST OP WEEK 0 1 2 3 4 5 6 7 8 9   DATE 4/15/2022 2022 2022 2022 2022 2022 2022 6/3/2022 6/10/2022 2022   POST OP WEEK 10 11 12 13 14 15 16 17 18 19   DATE 2022 2022 7/15/2022 2022 2022 2022 2022 2022 2022   POST OP WEEK 20 21 22 23 24 25 26 27 28 29     Post OP week 11 as of 2022           Subjective   Kendell Mccall reports: Had some new elbow pain this morning, but this has resolved now.  No issues with home program.      Objective   See Exercise, Manual, and Modality Logs for complete treatment.     Passive Range of Motion      Right Shoulder   Flexion: 175 degrees   Abduction: 175 degrees   External rotation 90°: 85 degrees   Internal rotation 90°: 85 degrees     Reviewed home exercise program    Added isotonics and resisted band exercises.    Assessment/Plan   Progressing very well and good tolerance to light strengthening and stabilization.  No issues with home program.    Progress in accordance with post surgical guidelines and patient tolerance. Assess response to today's interventions.          Manual Therapy:         mins  04757;  Therapeutic Exercise:    35     mins  55487;     Neuromuscular Lydia:        mins  69212;    Therapeutic Activity:     10     mins  72194;     Gait Training:           mins  74478;     Ultrasound:          mins  40088;    Electrical Stimulation:         mins  81391 ( );  Dry Needling          mins  self-pay    Timed Treatment:   45   mins   Total Treatment:     45   mins    Ryan Mac, PT  Physical Therapist

## 2022-04-28 ENCOUNTER — TREATMENT (OUTPATIENT)
Dept: PHYSICAL THERAPY | Facility: CLINIC | Age: 57
End: 2022-04-28

## 2022-04-28 DIAGNOSIS — S49.91XA INJURY OF RIGHT SHOULDER, INITIAL ENCOUNTER: ICD-10-CM

## 2022-04-28 DIAGNOSIS — Z98.890 S/P ROTATOR CUFF REPAIR: ICD-10-CM

## 2022-04-28 DIAGNOSIS — Z98.890 HX OF SHOULDER SURGERY: Primary | ICD-10-CM

## 2022-04-28 DIAGNOSIS — S46.911A STRAIN OF RIGHT SHOULDER, INITIAL ENCOUNTER: ICD-10-CM

## 2022-04-28 PROCEDURE — 97110 THERAPEUTIC EXERCISES: CPT | Performed by: PHYSICAL THERAPIST

## 2022-04-28 PROCEDURE — 97530 THERAPEUTIC ACTIVITIES: CPT | Performed by: PHYSICAL THERAPIST

## 2022-05-03 ENCOUNTER — TREATMENT (OUTPATIENT)
Dept: PHYSICAL THERAPY | Facility: CLINIC | Age: 57
End: 2022-05-03

## 2022-05-03 DIAGNOSIS — Z98.890 S/P ROTATOR CUFF REPAIR: ICD-10-CM

## 2022-05-03 DIAGNOSIS — Z98.890 HX OF SHOULDER SURGERY: Primary | ICD-10-CM

## 2022-05-03 DIAGNOSIS — S49.91XA INJURY OF RIGHT SHOULDER, INITIAL ENCOUNTER: ICD-10-CM

## 2022-05-03 DIAGNOSIS — S46.911A STRAIN OF RIGHT SHOULDER, INITIAL ENCOUNTER: ICD-10-CM

## 2022-05-03 PROCEDURE — 97530 THERAPEUTIC ACTIVITIES: CPT | Performed by: PHYSICAL THERAPIST

## 2022-05-03 PROCEDURE — 97110 THERAPEUTIC EXERCISES: CPT | Performed by: PHYSICAL THERAPIST

## 2022-05-03 NOTE — PROGRESS NOTES
Physical Therapy Daily Progress Note        Patient: Kendell Mccall   : 1965  Diagnosis/ICD-10 Code:  Hx of shoulder surgery [Z98.890]  Referring practitioner: Ashu Frank MD  Date of Initial Visit: Type: THERAPY  Noted: 3/30/2022  Today's Date: 5/3/2022  Patient seen for 9 sessions    DATE 2022 2022 2022 2022 3/4/2022 3/11/2022 3/18/2022 3/25/2022 2022 2022   POST OP WEEK 0 1 2 3 4 5 6 7 8 9   DATE 4/15/2022 2022 2022 2022 2022 2022 2022 6/3/2022 6/10/2022 2022   POST OP WEEK 10 11 12 13 14 15 16 17 18 19   DATE 2022 2022 2022 7/15/2022 2022 2022 2022 2022 2022 2022   POST OP WEEK 20 21 22 23 24 25 26 27 28 29     Post OP week 12 as of 5/3/2022           Subjective   Kendell Mccall reports: Feels like his shoulder is doing well with only minor discomfort.      Objective   See Exercise, Manual, and Modality Logs for complete treatment.      Passive Range of Motion      Right Shoulder   Flexion: 175 degrees   Abduction: 175 degrees   External rotation 90°: 85 degrees   Internal rotation 90°: 85 degrees      Continued with early strengthening of the rotator cuff.                 Assessment/Plan   Continues to progress very well and with good tolerance to light strengthening and stabilization.  No issues with home program.     Continue to progress in accordance with post surgical guidelines and patient tolerance.              Manual Therapy:         mins  25425;  Therapeutic Exercise:    35     mins  27372;     Neuromuscular Lydia:        mins  46213;    Therapeutic Activity:     10     mins  85093;     Gait Training:           mins  08002;     Ultrasound:          mins  88569;    Electrical Stimulation:         mins  06243 ( );  Dry Needling          mins self-pay    Timed Treatment:   45   mins   Total Treatment:     45   mins    Ryan Mac PT  Physical Therapist

## 2022-05-05 ENCOUNTER — TREATMENT (OUTPATIENT)
Dept: PHYSICAL THERAPY | Facility: CLINIC | Age: 57
End: 2022-05-05

## 2022-05-05 DIAGNOSIS — Z98.890 S/P ROTATOR CUFF REPAIR: ICD-10-CM

## 2022-05-05 DIAGNOSIS — Z98.890 HX OF SHOULDER SURGERY: Primary | ICD-10-CM

## 2022-05-05 DIAGNOSIS — S46.911A STRAIN OF RIGHT SHOULDER, INITIAL ENCOUNTER: ICD-10-CM

## 2022-05-05 DIAGNOSIS — S49.91XA INJURY OF RIGHT SHOULDER, INITIAL ENCOUNTER: ICD-10-CM

## 2022-05-05 PROCEDURE — 97530 THERAPEUTIC ACTIVITIES: CPT | Performed by: PHYSICAL THERAPIST

## 2022-05-05 PROCEDURE — 97110 THERAPEUTIC EXERCISES: CPT | Performed by: PHYSICAL THERAPIST

## 2022-05-10 ENCOUNTER — TREATMENT (OUTPATIENT)
Dept: PHYSICAL THERAPY | Facility: CLINIC | Age: 57
End: 2022-05-10

## 2022-05-10 DIAGNOSIS — S49.91XA INJURY OF RIGHT SHOULDER, INITIAL ENCOUNTER: ICD-10-CM

## 2022-05-10 DIAGNOSIS — Z98.890 HX OF SHOULDER SURGERY: Primary | ICD-10-CM

## 2022-05-10 DIAGNOSIS — Z98.890 S/P ROTATOR CUFF REPAIR: ICD-10-CM

## 2022-05-10 DIAGNOSIS — S46.911A STRAIN OF RIGHT SHOULDER, INITIAL ENCOUNTER: ICD-10-CM

## 2022-05-10 PROCEDURE — 97530 THERAPEUTIC ACTIVITIES: CPT | Performed by: PHYSICAL THERAPIST

## 2022-05-10 PROCEDURE — 97110 THERAPEUTIC EXERCISES: CPT | Performed by: PHYSICAL THERAPIST

## 2022-05-10 NOTE — PROGRESS NOTES
Physical Therapy Daily Progress Note        Patient: Kendell Mccall   : 1965  Diagnosis/ICD-10 Code:  Hx of shoulder surgery [Z98.890]  Referring practitioner: Ashu Frank MD  Date of Initial Visit: No linked episodes  Today's Date: 5/10/2022  Patient seen for 10 sessions    DATE 2022 2022 2022 2022 3/4/2022 3/11/2022 3/18/2022 3/25/2022 2022 2022   POST OP WEEK 0 1 2 3 4 5 6 7 8 9   DATE 4/15/2022 2022 2022 2022 2022 2022 2022 6/3/2022 6/10/2022 2022   POST OP WEEK 10 11 12 13 14 15 16 17 18 19   DATE 2022 2022 2022 7/15/2022 2022 2022 2022 2022 2022 2022   POST OP WEEK 20 21 22 23 24 25 26 27 28 29     Post OP week 13 as of 5/10/2022           Subjective   Kendell Mccall reports: No issues and continues to do well.        Objective   See Exercise, Manual, and Modality Logs for complete treatment.      Passive Range of Motion      Right Shoulder   Flexion: 175 degrees   Abduction: 175 degrees   External rotation 90°: 85 degrees   Internal rotation 90°: 85 degrees      Continued with early strengthening of the rotator cuff.     Assessment/Plan   Continues to progress very well and with good tolerance to light strengthening and stabilization.  No issues with home program.     Continue to progress in accordance with post surgical guidelines and patient tolerance. Initiated light functional exercises as tolerated.  Add resistance to existing  therapeutic exercises as tolerated.             Manual Therapy:         mins  51726;  Therapeutic Exercise:    35     mins  16226;     Neuromuscular Lydia:        mins  45214;    Therapeutic Activity:     10     mins  40216;     Gait Training:           mins  43817;     Ultrasound:          mins  71938;    Electrical Stimulation:         mins  98977 ( );  Dry Needling          mins self-pay    Timed Treatment:   45   mins   Total Treatment:     45    mins    Ryan Mac, PT  Physical Therapist

## 2022-05-10 NOTE — PROGRESS NOTES
Physical Therapy Progress Note    Patient: Kendell Mccall   : 1965  Diagnosis/ICD-10 Code:  Hx of shoulder surgery [Z98.890]  Referring practitioner: Ashu Frank MD  Date of Initial Visit: Type: THERAPY  Noted: 3/30/2022  Today's Date: 5/10/2022  Patient seen for 11 sessions    DATE 2022 2022 2022 2022 3/4/2022 3/11/2022 3/18/2022 3/25/2022 2022 2022   POST OP WEEK 0 1 2 3 4 5 6 7 8 9   DATE 4/15/2022 2022 2022 2022 2022 2022 2022 6/3/2022 6/10/2022 2022   POST OP WEEK 10 11 12 13 14 15 16 17 18 19   DATE 2022 2022 2022 7/15/2022 2022 2022 2022 2022 2022 2022   POST OP WEEK 20 21 22 23 24 25 26 27 28 29     Post OP week 13 as of 5/10/2022           Subjective   Kendell Mccall reports: Shoulder is mostly pain-free other than minor aching.  He feels like he is doing fairly well at this point.    Subjective Questionnaire:  Quick Dash = 34% disability.      Objective      Passive Range of Motion      Right Shoulder   Flexion: 175 degrees   Abduction: 175 degrees   External rotation 90°: 85 degrees   Internal rotation 90°: 85 degrees     Strength/Myotome Testing     Right Shoulder     Planes of Motion   Flexion: 4-   Abduction: 4-  External rotation at 0°: 4-   Internal rotation at 0°: 4+         See Exercise, Manual, and Modality Logs for complete treatment.       Assessment/Plan   Doing well thus far with passive range of motion WFL and good tolerance to isometrics and introduction to light strengthening.  Still needs work on strength and stability with active range of motion of shoulder still limited and some shoulder instability still present.       Assessment    Assessment details: PT referral for:  Z98.890 (ICD-10-CM) - Hx of shoulder surgery.    Pt presents today with the following impairments:  --  Subjective questionnaire score (Quick DASH).  --  R>LUE strength.  --  Posture.    The above  impairments contribute to the following functional deficits:  Difficulty with washing hair.  Unable to work.    The pt would benefit from continued skilled PT sessions to address impairments noted above in order to improve functional mobility and quality of life.    Prognosis: good    Goals  Plan Goals: STGs to be met in 4 weeks:  --  Require </= 3 cues with HEP performance. (MET)   --  Pain levels at worst </= 2/10. (MET)   --  R shld PROM flex/abd in supine to >/= 145 degrees without pain. (MET)   --  R shld AROM flex/abd in sitting to >/= 145 degrees without pain. (MET)   --  Pt will be able to wash his hair with his RUE without difficulty. (MET)     LTGs to be met by end of POC:  --  Require no cues with HEP performance for d/c planning. (NOT MET)   --  Pain levels at worst </= 2/10.  (MET)   --  R shld PROM flex/abd in supine to >/= 170 degrees without pain. (MET)   --  R shld PROM ER (in scaption) in supine to >/= 70 degrees without pain. (MET)   --  R shld AROM flex/abd in sitting to >/= 170 degrees without pain. (MET)   --  R shld flex, abd, IR, ER MMT in sitting to 5/5 without pain. (NOT MET)   --  Quick DASH score </= 10% disability. (NOT MET)   --  Return to work per MD approval with min increase in pain with functional work  (NOT MET)     Plan:  Continue progression into shoulder strengthening.  2 - 3 times per week for 6 weeks.    Please advise and sign as order as appropriate.   Thank you.         Signature: __________________________________  Ashu Frank MD           Manual Therapy:         mins  16948;  Therapeutic Exercise:    35     mins  56887;     Neuromuscular Lydia:        mins  25676;    Therapeutic Activity:   15       mins  41234;     Gait Training:           mins  55045;     Ultrasound:          mins  53907;    Electrical Stimulation:         mins  76546 ( );  Dry Needling          mins self-pay    Timed Treatment:   50   mins   Total Treatment:     50   mins    Ryan Mac  PT  Physical Therapist

## 2022-05-12 ENCOUNTER — TREATMENT (OUTPATIENT)
Dept: PHYSICAL THERAPY | Facility: CLINIC | Age: 57
End: 2022-05-12

## 2022-05-12 DIAGNOSIS — S49.91XA INJURY OF RIGHT SHOULDER, INITIAL ENCOUNTER: ICD-10-CM

## 2022-05-12 DIAGNOSIS — S46.911A STRAIN OF RIGHT SHOULDER, INITIAL ENCOUNTER: ICD-10-CM

## 2022-05-12 DIAGNOSIS — Z98.890 S/P ROTATOR CUFF REPAIR: ICD-10-CM

## 2022-05-12 DIAGNOSIS — Z98.890 HX OF SHOULDER SURGERY: Primary | ICD-10-CM

## 2022-05-12 PROCEDURE — 97110 THERAPEUTIC EXERCISES: CPT | Performed by: PHYSICAL THERAPIST

## 2022-05-12 PROCEDURE — 97530 THERAPEUTIC ACTIVITIES: CPT | Performed by: PHYSICAL THERAPIST

## 2022-05-17 ENCOUNTER — TREATMENT (OUTPATIENT)
Dept: PHYSICAL THERAPY | Facility: CLINIC | Age: 57
End: 2022-05-17

## 2022-05-17 DIAGNOSIS — Z98.890 HX OF SHOULDER SURGERY: Primary | ICD-10-CM

## 2022-05-17 DIAGNOSIS — Z98.890 S/P ROTATOR CUFF REPAIR: ICD-10-CM

## 2022-05-17 DIAGNOSIS — S49.91XA INJURY OF RIGHT SHOULDER, INITIAL ENCOUNTER: ICD-10-CM

## 2022-05-17 DIAGNOSIS — S46.911A STRAIN OF RIGHT SHOULDER, INITIAL ENCOUNTER: ICD-10-CM

## 2022-05-17 PROCEDURE — 97530 THERAPEUTIC ACTIVITIES: CPT | Performed by: PHYSICAL THERAPIST

## 2022-05-17 PROCEDURE — 97110 THERAPEUTIC EXERCISES: CPT | Performed by: PHYSICAL THERAPIST

## 2022-05-19 ENCOUNTER — TREATMENT (OUTPATIENT)
Dept: PHYSICAL THERAPY | Facility: CLINIC | Age: 57
End: 2022-05-19

## 2022-05-19 DIAGNOSIS — Z98.890 HX OF SHOULDER SURGERY: Primary | ICD-10-CM

## 2022-05-19 DIAGNOSIS — S49.91XA INJURY OF RIGHT SHOULDER, INITIAL ENCOUNTER: ICD-10-CM

## 2022-05-19 DIAGNOSIS — S46.911A STRAIN OF RIGHT SHOULDER, INITIAL ENCOUNTER: ICD-10-CM

## 2022-05-19 DIAGNOSIS — Z98.890 S/P ROTATOR CUFF REPAIR: ICD-10-CM

## 2022-05-19 PROCEDURE — 97110 THERAPEUTIC EXERCISES: CPT | Performed by: PHYSICAL THERAPIST

## 2022-05-19 PROCEDURE — 97530 THERAPEUTIC ACTIVITIES: CPT | Performed by: PHYSICAL THERAPIST

## 2022-05-19 NOTE — PROGRESS NOTES
Physical Therapy Progress Note    Patient: Kendell Mccall   : 1965  Diagnosis/ICD-10 Code:  Hx of shoulder surgery [Z98.890]  Referring practitioner: Ashu Frank MD  Date of Initial Visit: Type: THERAPY  Noted: 3/30/2022  Today's Date: 2022  Patient seen for 14 sessions    DATE 2022 2022 2022 2022 3/4/2022 3/11/2022 3/18/2022 3/25/2022 2022 2022   POST OP WEEK 0 1 2 3 4 5 6 7 8 9   DATE 4/15/2022 2022 2022 2022 2022 2022 2022 6/3/2022 6/10/2022 2022   POST OP WEEK 10 11 12 13 14 15 16 17 18 19   DATE 2022 2022 7/15/2022 2022 2022 20222022   POST OP WEEK 20 21 22 23 24 25 26 27 28 29     Post OP week 14as of 2022           Subjective   Kendell Mccall reports: Shoulder is mostly pain-free other than minor aching.  He feels like he is doing fairly well at this point.      Subjective Questionnaire:  Quick Dash = 36% disability.      Objective      Passive Range of Motion      Right Shoulder   Flexion: 175 degrees   Abduction: 175 degrees   External rotation 90°: 85 degrees   Internal rotation 90°: 85 degrees   Internal rotation BTB L2 (Left T12)    Strength/Myotome Testing     Right Shoulder     Planes of Motion   Flexion: 4-   Abduction: 4-  External rotation at 0°: 4-   Internal rotation at 0°: 4+     Given his lack of ER behind the back, started pulleys for BTB IR and sleeper's stretch which was added to home program.        See Exercise, Manual, and Modality Logs for complete treatment.     Copy of this note printed for patient to take to MD for his follow up visit tomorrow      Assessment/Plan   Doing well thus far with passive range of motion WFL and good tolerance to isometrics and introduction to light strengthening.  Still needs work on strength and stability with active range of motion of shoulder still limited and some shoulder instability still present.        Assessment    Pt presents today with the following impairments:  --  Subjective questionnaire score (Quick DASH).  --  R>LUE strength.  --  Posture.    The above impairments contribute to the following functional deficits:  Difficulty with washing hair.  Unable to work.    The pt would benefit from continued skilled PT sessions to address impairments noted above in order to improve functional mobility and quality of life.    Prognosis: good    Goals  Plan Goals: STGs to be met in 4 weeks:  --  Require </= 3 cues with HEP performance. (MET)   --  Pain levels at worst </= 2/10. (MET)   --  R shld PROM flex/abd in supine to >/= 145 degrees without pain. (MET)   --  R shld AROM flex/abd in sitting to >/= 145 degrees without pain. (MET)   --  Pt will be able to wash his hair with his RUE without difficulty. (MET)     LTGs to be met by end of POC:  --  Require no cues with HEP performance for d/c planning. (NOT MET)   --  Pain levels at worst </= 2/10.  (MET)   --  R shld PROM flex/abd in supine to >/= 170 degrees without pain. (MET)   --  R shld PROM ER (in scaption) in supine to >/= 70 degrees without pain. (MET)   --  R shld AROM flex/abd in sitting to >/= 170 degrees without pain. (MET)   --  R shld flex, abd, IR, ER MMT in sitting to 5/5 without pain. (NOT MET)   --  Quick DASH score </= 10% disability. (NOT MET)   --  Return to work per MD approval with min increase in pain with functional work  (NOT MET)     Plan:  Continue progression into shoulder strengthening.  2 - 3 times per week for 6 weeks.         Manual Therapy:         mins  26889;  Therapeutic Exercise:    35     mins  85415;     Neuromuscular Lydia:        mins  23260;    Therapeutic Activity:   15       mins  44968;     Gait Training:           mins  81543;     Ultrasound:          mins  82048;    Electrical Stimulation:         mins  81860 ( );  Dry Needling          mins self-pay    Timed Treatment:   50   mins   Total Treatment:     50    mins    Ryan Mac, PT  Physical Therapist

## 2022-05-19 NOTE — PROGRESS NOTES
"   Physical Therapy Daily Progress Note        Patient: Kendell Mccall   : 1965  Diagnosis/ICD-10 Code:  Hx of shoulder surgery [Z98.890]  Referring practitioner: Ashu Frank MD  Date of Initial Visit: Type: THERAPY  Noted: 3/30/2022  Today's Date: 22  Patient seen for 13 sessions    DATE 2022 2022 2022 2022 3/4/2022 3/11/2022 3/18/2022 3/25/2022 2022 2022   POST OP WEEK 0 1 2 3 4 5 6 7 8 9   DATE 4/15/2022 2022 2022 2022 2022 2022 2022 6/3/2022 6/10/2022 2022   POST OP WEEK 10 11 12 13 14 15 16 17 18 19   DATE 2022 2022 2022 7/15/2022 2022 2022 2022 2022 2022 2022   POST OP WEEK 20 21 22 23 24 25 26 27 28 29     Post OP week 14 as of 2022           Subjective   Kendell Mccall reports: shoulder is doing \"pretty well\" some weakness with overhead activity.    Objective   See Exercise, Manual, and Modality Logs for complete treatment.      Passive Range of Motion      Right Shoulder   Flexion: 175 degrees   Abduction: 175 degrees   External rotation 90°: 85 degrees   Internal rotation 90°: 85 degrees      Progressed / advanced exercises as noted in flow sheets;      Assessment/Plan   Continues to progress very well and with good tolerance to light strengthening and stabilization.  No issues with home program.  Needs work on strengthening in elevated planes.     Continue to progress in accordance with post surgical guidelines and patient tolerance. Initiated more functional exercises as tolerated.  Progress resistance to existing  therapeutic exercises as tolerated.             Manual Therapy:         mins  48009;  Therapeutic Exercise:    35     mins  31384;     Neuromuscular Lydia:        mins  58797;    Therapeutic Activity:     10     mins  83773;     Gait Training:           mins  23082;     Ultrasound:          mins  77863;    Electrical Stimulation:         mins  52607 ( );  Dry " Hardy          mins self-pay    Timed Treatment:   45   mins   Total Treatment:     45   mins    Ryan Mac, PT  Physical Therapist

## 2022-05-24 ENCOUNTER — TREATMENT (OUTPATIENT)
Dept: PHYSICAL THERAPY | Facility: CLINIC | Age: 57
End: 2022-05-24

## 2022-05-24 DIAGNOSIS — S46.911A STRAIN OF RIGHT SHOULDER, INITIAL ENCOUNTER: ICD-10-CM

## 2022-05-24 DIAGNOSIS — Z98.890 S/P ROTATOR CUFF REPAIR: ICD-10-CM

## 2022-05-24 DIAGNOSIS — Z98.890 HX OF SHOULDER SURGERY: Primary | ICD-10-CM

## 2022-05-24 DIAGNOSIS — S49.91XA INJURY OF RIGHT SHOULDER, INITIAL ENCOUNTER: ICD-10-CM

## 2022-05-24 PROCEDURE — 97530 THERAPEUTIC ACTIVITIES: CPT | Performed by: PHYSICAL THERAPIST

## 2022-05-24 PROCEDURE — 97110 THERAPEUTIC EXERCISES: CPT | Performed by: PHYSICAL THERAPIST

## 2022-05-24 NOTE — PROGRESS NOTES
Physical Therapy Daily Progress Note        Patient: Kendell Mccall   : 1965  Diagnosis/ICD-10 Code:  Hx of shoulder surgery [Z98.890]  Referring practitioner: Ashu Frank MD  Date of Initial Visit: Type: THERAPY  Noted: 3/30/2022  Today's Date: 22  Patient seen for 15 sessions    DATE 2022 2022 2022 2022 3/4/2022 3/11/2022 3/18/2022 3/25/2022 2022 2022   POST OP WEEK 0 1 2 3 4 5 6 7 8 9   DATE 4/15/2022 2022 2022 2022 2022 2022 2022 6/3/2022 6/10/2022 2022   POST OP WEEK 10 11 12 13 14 15 16 17 18 19   DATE 2022 2022 7/15/2022 2022 2022 20222022   POST OP WEEK 20 21 22 23 24 25 26 27 28 29     Post OP week 15 as of 2022           Subjective   Kendell Mccall reports: Shoulder is feeling pretty good, no major trouble with it.       Objective   MD orders to Cont PT x 4 weeks with focus on internal rotation light intensity. Work restriction, no lifting over 1#    See Exercise, Manual, and Modality Logs for complete treatment.      Passive Range of Motion      Right Shoulder   Flexion: 175 degrees   Abduction: 175 degrees   External rotation 90°: 85 degrees   Internal rotation 90°: 85 degrees   IR BTB: L1     Progressed / advanced exercises as noted in flow sheets;      Assessment/Plan   Continues to progress very well and with good tolerance to light strengthening and stabilization.  No issues with home program.  Needs work on strengthening in elevated planes.  Needs work on IR BTB     Continue to progress in accordance with post surgical guidelines and patient tolerance. Initiated more functional exercises as tolerated.             Manual Therapy:         mins  12249;  Therapeutic Exercise:    35     mins  46639;     Neuromuscular Lydia:        mins  15789;    Therapeutic Activity:     10     mins  23106;     Gait Training:           mins  22609;     Ultrasound:          mins   15217;    Electrical Stimulation:         mins  08304 ( );  Dry Needling          mins self-pay    Timed Treatment:   45   mins   Total Treatment:     45   mins    Ryan Mac PT  Physical Therapist

## 2022-05-26 ENCOUNTER — TREATMENT (OUTPATIENT)
Dept: PHYSICAL THERAPY | Facility: CLINIC | Age: 57
End: 2022-05-26

## 2022-05-26 DIAGNOSIS — Z98.890 S/P ROTATOR CUFF REPAIR: ICD-10-CM

## 2022-05-26 DIAGNOSIS — Z98.890 HX OF SHOULDER SURGERY: Primary | ICD-10-CM

## 2022-05-26 DIAGNOSIS — S46.911A STRAIN OF RIGHT SHOULDER, INITIAL ENCOUNTER: ICD-10-CM

## 2022-05-26 DIAGNOSIS — S49.91XA INJURY OF RIGHT SHOULDER, INITIAL ENCOUNTER: ICD-10-CM

## 2022-05-26 PROCEDURE — 97530 THERAPEUTIC ACTIVITIES: CPT | Performed by: PHYSICAL THERAPIST

## 2022-05-26 PROCEDURE — 97110 THERAPEUTIC EXERCISES: CPT | Performed by: PHYSICAL THERAPIST

## 2022-06-01 ENCOUNTER — TREATMENT (OUTPATIENT)
Dept: PHYSICAL THERAPY | Facility: CLINIC | Age: 57
End: 2022-06-01

## 2022-06-01 DIAGNOSIS — Z98.890 HX OF SHOULDER SURGERY: Primary | ICD-10-CM

## 2022-06-01 DIAGNOSIS — S46.911A STRAIN OF RIGHT SHOULDER, INITIAL ENCOUNTER: ICD-10-CM

## 2022-06-01 DIAGNOSIS — Z98.890 S/P ROTATOR CUFF REPAIR: ICD-10-CM

## 2022-06-01 DIAGNOSIS — S49.91XA INJURY OF RIGHT SHOULDER, INITIAL ENCOUNTER: ICD-10-CM

## 2022-06-01 PROCEDURE — 97110 THERAPEUTIC EXERCISES: CPT | Performed by: PHYSICAL THERAPIST

## 2022-06-01 PROCEDURE — 97530 THERAPEUTIC ACTIVITIES: CPT | Performed by: PHYSICAL THERAPIST

## 2022-06-03 ENCOUNTER — TREATMENT (OUTPATIENT)
Dept: PHYSICAL THERAPY | Facility: CLINIC | Age: 57
End: 2022-06-03

## 2022-06-03 DIAGNOSIS — S46.911A STRAIN OF RIGHT SHOULDER, INITIAL ENCOUNTER: ICD-10-CM

## 2022-06-03 DIAGNOSIS — S49.91XA INJURY OF RIGHT SHOULDER, INITIAL ENCOUNTER: ICD-10-CM

## 2022-06-03 DIAGNOSIS — Z98.890 HX OF SHOULDER SURGERY: Primary | ICD-10-CM

## 2022-06-03 DIAGNOSIS — Z98.890 S/P ROTATOR CUFF REPAIR: ICD-10-CM

## 2022-06-03 PROCEDURE — 97530 THERAPEUTIC ACTIVITIES: CPT | Performed by: PHYSICAL THERAPIST

## 2022-06-03 PROCEDURE — 97110 THERAPEUTIC EXERCISES: CPT | Performed by: PHYSICAL THERAPIST

## 2022-06-03 NOTE — PROGRESS NOTES
Physical Therapy Daily Progress Note        Patient: Kendell Mccall   : 1965  Diagnosis/ICD-10 Code:  Hx of shoulder surgery [Z98.890]  Referring practitioner: Ashu Frank MD  Date of Initial Visit: Type: THERAPY  Noted: 3/30/2022  Today's Date: 22  Patient seen for 18 sessions    DATE 2022 2022 2022 2022 3/4/2022 3/11/2022 3/18/2022 3/25/2022 2022 2022   POST OP WEEK 0 1 2 3 4 5 6 7 8 9   DATE 4/15/2022 2022 2022 2022 2022 2022 2022 6/3/2022 6/10/2022 2022   POST OP WEEK 10 11 12 13 14 15 16 17 18 19   DATE 2022 2022 2022 7/15/2022 2022 2022 2022 2022 2022 2022   POST OP WEEK 20 21 22 23 24 25 26 27 28 29     Post OP week 17 as of 6/3/2022           Subjective   Kendell Mccall reports: Shoulder continues to feel pretty good other than with overhead activities and still feeling a little weak.    Objective     See Exercise, Manual, and Modality Logs for complete treatment.      Passive Range of Motion      Right Shoulder   Flexion: 175 degrees   Abduction: 175 degrees   External rotation 90°: 85 degrees   Internal rotation 90°: 85 degrees   IR BTB: L1     Progressed / advanced exercises as noted in flow sheets;      Assessment/Plan   Continues to progress very well and with good tolerance to light strengthening and stabilization.  No issues with home program.  Needs work on strengthening in elevated planes.  Improved, but needs work on IR BTB     Continue to progress in accordance with post surgical guidelines and patient tolerance. Continue to initiate more functional exercises as tolerated.             Manual Therapy:         mins  19044;  Therapeutic Exercise:    35     mins  57997;     Neuromuscular Lydia:        mins  21399;    Therapeutic Activity:     10     mins  53459;     Gait Training:           mins  68479;     Ultrasound:          mins  32884;    Electrical Stimulation:         mins   98543 ( );  Dry Needling          mins self-pay    Timed Treatment:   45   mins   Total Treatment:     45   mins    Ryan Mac PT  Physical Therapist

## 2022-06-06 ENCOUNTER — TELEPHONE (OUTPATIENT)
Dept: PHYSICAL THERAPY | Facility: CLINIC | Age: 57
End: 2022-06-06

## 2022-06-09 ENCOUNTER — TELEPHONE (OUTPATIENT)
Dept: PHYSICAL THERAPY | Facility: OTHER | Age: 57
End: 2022-06-09

## 2022-06-20 DIAGNOSIS — F98.8 ATTENTION DEFICIT DISORDER (ADD) WITHOUT HYPERACTIVITY: ICD-10-CM

## 2022-06-20 RX ORDER — DEXTROAMPHETAMINE SACCHARATE, AMPHETAMINE ASPARTATE, DEXTROAMPHETAMINE SULFATE AND AMPHETAMINE SULFATE 3.125; 3.125; 3.125; 3.125 MG/1; MG/1; MG/1; MG/1
12.5 TABLET ORAL 2 TIMES DAILY
Qty: 60 TABLET | Refills: 0 | OUTPATIENT
Start: 2022-06-20

## 2022-06-20 NOTE — TELEPHONE ENCOUNTER
Caller: Kendell Mccall    Relationship: Self    Best call back number: [phone]    Requested Prescriptions:   Requested Prescriptions     Pending Prescriptions Disp Refills   • amphetamine-dextroamphetamine (Adderall) 12.5 MG tablet 60 tablet 0     Sig: Take 1 tablet by mouth 2 (Two) Times a Day.        Pharmacy where request should be sent: [facility] [address] [address] - [phone]  - [phone] FX     Additional details provided by patient: PATIENT IS OUT OF THIS MEDICATION.     Does the patient have less than a 3 day supply:  [x] Yes  [] No    Parrish Wilson   06/20/22 10:02 EDT

## 2022-06-21 ENCOUNTER — TREATMENT (OUTPATIENT)
Dept: PHYSICAL THERAPY | Facility: CLINIC | Age: 57
End: 2022-06-21

## 2022-06-21 DIAGNOSIS — Z98.890 S/P ROTATOR CUFF REPAIR: ICD-10-CM

## 2022-06-21 DIAGNOSIS — Z98.890 HX OF SHOULDER SURGERY: Primary | ICD-10-CM

## 2022-06-21 DIAGNOSIS — S49.91XA INJURY OF RIGHT SHOULDER, INITIAL ENCOUNTER: ICD-10-CM

## 2022-06-21 DIAGNOSIS — S46.911A STRAIN OF RIGHT SHOULDER, INITIAL ENCOUNTER: ICD-10-CM

## 2022-06-21 PROCEDURE — 97110 THERAPEUTIC EXERCISES: CPT | Performed by: PHYSICAL THERAPIST

## 2022-06-21 PROCEDURE — 97530 THERAPEUTIC ACTIVITIES: CPT | Performed by: PHYSICAL THERAPIST

## 2022-06-21 NOTE — PROGRESS NOTES
Physical Therapy Daily Progress Note        Patient: Kendell Mccall   : 1965  Diagnosis/ICD-10 Code:  Hx of shoulder surgery [Z98.890]  Referring practitioner: Ashu Frank MD  Date of Initial Visit: Type: THERAPY  Noted: 3/30/2022  Today's Date: 22  Patient seen for 19 sessions    DATE 2022 2022 2022 2022 3/4/2022 3/11/2022 3/18/2022 3/25/2022 2022 2022   POST OP WEEK 0 1 2 3 4 5 6 7 8 9   DATE 4/15/2022 2022 2022 2022 2022 2022 2022 6/3/2022 6/10/2022 2022   POST OP WEEK 10 11 12 13 14 15 16 17 18 19   DATE 2022 2022 7/15/2022 2022 2022 20222022   POST OP WEEK 20 21 22 23 24 25 26 27 28 29     Post OP week 19 as of 2022           Subjective   Kendell Mccall reports: Shoulder continues to feel better and better.  Still weak with overhead activities.      Objective     Order from MD for work conditioning.       See Exercise, Manual, and Modality Logs for complete treatment.      Passive Range of Motion      Right Shoulder   Flexion: 175 degrees   Abduction: 175 degrees   External rotation 90°: 85 degrees   Internal rotation 90°: 85 degrees   IR BTB: L1     Progressed / advanced exercises as noted in flow sheets;      Assessment/Plan   Continues to progress very well and with good tolerance to light strengthening and stabilization.  No issues with home program.  Needs work on strengthening in elevated planes.       Continue to progress in accordance with post surgical guidelines and patient tolerance. Continue to initiate more functional exercises as tolerated.   Initiate work conditioning.          Manual Therapy:         mins  33271;  Therapeutic Exercise:    35     mins  40297;     Neuromuscular Lydia:        mins  53736;    Therapeutic Activity:     10     mins  16210;     Gait Training:           mins  25118;     Ultrasound:          mins  97726;    Electrical  Stimulation:         mins  57490 ( );  Dry Needling          mins self-pay    Timed Treatment:   45   mins   Total Treatment:     45   mins    Ryan Mac PT  Physical Therapist

## 2022-06-23 ENCOUNTER — OFFICE VISIT (OUTPATIENT)
Dept: FAMILY MEDICINE CLINIC | Facility: CLINIC | Age: 57
End: 2022-06-23

## 2022-06-23 ENCOUNTER — TREATMENT (OUTPATIENT)
Dept: PHYSICAL THERAPY | Facility: CLINIC | Age: 57
End: 2022-06-23

## 2022-06-23 VITALS
HEART RATE: 80 BPM | HEIGHT: 72 IN | SYSTOLIC BLOOD PRESSURE: 136 MMHG | WEIGHT: 290 LBS | BODY MASS INDEX: 39.28 KG/M2 | OXYGEN SATURATION: 97 % | TEMPERATURE: 97.3 F | RESPIRATION RATE: 16 BRPM | DIASTOLIC BLOOD PRESSURE: 83 MMHG

## 2022-06-23 DIAGNOSIS — F17.200 SMOKER: ICD-10-CM

## 2022-06-23 DIAGNOSIS — Z83.3 FAMILY HISTORY OF DIABETES MELLITUS: ICD-10-CM

## 2022-06-23 DIAGNOSIS — Z00.00 PREVENTATIVE HEALTH CARE: ICD-10-CM

## 2022-06-23 DIAGNOSIS — E66.01 CLASS 2 SEVERE OBESITY DUE TO EXCESS CALORIES WITH SERIOUS COMORBIDITY AND BODY MASS INDEX (BMI) OF 39.0 TO 39.9 IN ADULT: ICD-10-CM

## 2022-06-23 DIAGNOSIS — D75.1 POLYCYTHEMIA: ICD-10-CM

## 2022-06-23 DIAGNOSIS — Z12.5 SCREENING PSA (PROSTATE SPECIFIC ANTIGEN): ICD-10-CM

## 2022-06-23 DIAGNOSIS — E78.2 MIXED HYPERLIPIDEMIA: Primary | ICD-10-CM

## 2022-06-23 DIAGNOSIS — F98.8 ATTENTION DEFICIT DISORDER (ADD) WITHOUT HYPERACTIVITY: ICD-10-CM

## 2022-06-23 DIAGNOSIS — S46.911A STRAIN OF RIGHT SHOULDER, INITIAL ENCOUNTER: ICD-10-CM

## 2022-06-23 DIAGNOSIS — Z13.220 LIPID SCREENING: ICD-10-CM

## 2022-06-23 DIAGNOSIS — Z98.890 HX OF SHOULDER SURGERY: Primary | ICD-10-CM

## 2022-06-23 DIAGNOSIS — S49.91XA INJURY OF RIGHT SHOULDER, INITIAL ENCOUNTER: ICD-10-CM

## 2022-06-23 DIAGNOSIS — Z98.890 S/P ROTATOR CUFF REPAIR: ICD-10-CM

## 2022-06-23 PROBLEM — E66.812 CLASS 2 SEVERE OBESITY DUE TO EXCESS CALORIES WITH SERIOUS COMORBIDITY AND BODY MASS INDEX (BMI) OF 39.0 TO 39.9 IN ADULT: Status: ACTIVE | Noted: 2022-06-23

## 2022-06-23 PROCEDURE — 99213 OFFICE O/P EST LOW 20 MIN: CPT | Performed by: NURSE PRACTITIONER

## 2022-06-23 PROCEDURE — 97110 THERAPEUTIC EXERCISES: CPT | Performed by: PHYSICAL THERAPIST

## 2022-06-23 PROCEDURE — 97530 THERAPEUTIC ACTIVITIES: CPT | Performed by: PHYSICAL THERAPIST

## 2022-06-23 RX ORDER — DEXTROAMPHETAMINE SACCHARATE, AMPHETAMINE ASPARTATE, DEXTROAMPHETAMINE SULFATE AND AMPHETAMINE SULFATE 3.125; 3.125; 3.125; 3.125 MG/1; MG/1; MG/1; MG/1
12.5 TABLET ORAL 2 TIMES DAILY
Qty: 60 TABLET | Refills: 0 | Status: SHIPPED | OUTPATIENT
Start: 2022-06-23 | End: 2022-08-09 | Stop reason: SDUPTHER

## 2022-06-23 RX ORDER — TRIAMTERENE AND HYDROCHLOROTHIAZIDE 37.5; 25 MG/1; MG/1
1 CAPSULE ORAL DAILY
Qty: 90 CAPSULE | Refills: 1 | Status: SHIPPED | OUTPATIENT
Start: 2022-06-23 | End: 2023-04-05

## 2022-06-23 NOTE — PROGRESS NOTES
Physical Therapy Daily Progress Note        Patient: Kendell Mccall   : 1965  Diagnosis/ICD-10 Code:  Hx of shoulder surgery [Z98.890]  Referring practitioner: Ashu Frank MD  Date of Initial Visit: Type: THERAPY  Noted: 3/30/2022  Today's Date: 22  Patient seen for 20 sessions    DATE 2022 2022 2022 2022 3/4/2022 3/11/2022 3/18/2022 3/25/2022 2022 2022   POST OP WEEK 0 1 2 3 4 5 6 7 8 9   DATE 4/15/2022 2022 2022 2022 2022 2022 2022 6/3/2022 6/10/2022 2022   POST OP WEEK 10 11 12 13 14 15 16 17 18 19   DATE 2022 2022 7/15/2022 2022 2022 20222022   POST OP WEEK 20 21 22 23 24 25 26 27 28 29     Post OP week 19 as of 2022           Subjective   Kendell Mccall reports: Shoulder still doing well and is tolerating home program well.    Objective     Order from MD for work conditioning.   Initiated work simulation activities today.    See Exercise, Manual, and Modality Logs for complete treatment.      Passive Range of Motion      Right Shoulder   Flexion: 175 degrees   Abduction: 175 degrees   External rotation 90°: 85 degrees   Internal rotation 90°: 85 degrees   IR BTB: L1     Progressed / advanced exercises as noted in flow sheets; added work simulation activities     Assessment/Plan   Continues to progress very well and with good tolerance to light strengthening and stabilization.  No issues with work simulation activites added today.     Continue to progress in accordance with post surgical guidelines and patient tolerance. Continue to initiate more functional exercises as tolerated.   Initiate work conditioning.          Manual Therapy:         mins  13455;  Therapeutic Exercise:    45    mins  33736;     Neuromuscular Lydia:        mins  92008;    Therapeutic Activity:     10     mins  70561;     Gait Training:           mins  08927;     Ultrasound:          mins  53306;     Electrical Stimulation:         mins  09727 ( );  Dry Needling          mins self-pay    Timed Treatment:   55   mins   Total Treatment:     55   mins    Ryan Mac PT  Physical Therapist

## 2022-06-23 NOTE — PATIENT INSTRUCTIONS
Diet and exercise  Restart Adderall  Fasting blood work  Sent in [facility]  Schedule eye exam  Call if left shoulder started hurting more  Follow-up 6 months

## 2022-06-23 NOTE — PROGRESS NOTES
"    Kendell Mccall is a 56 y.o. male.     56-year-old white male smoker with ADHD, chronic low back pain, hypertension, who comes in today for follow-up visit    Blood pressure 136/82 heart rate 80 he denies any chest pain, dyspnea, tachycardia or dizziness.  Since I saw patient he had a Workmen's Comp. injury at work where he fell and tore right rotator cuff.  He had surgery he is currently in therapy and will soon be released back to work.  He still has chronic pain in his left shoulder and if this gets worse we will probably try to get an MRI    Patient's blood work in January showed elevated hemoglobin 18.4 we will recheck today he does smoke.  Other labs were stable.  Will order fasting labs.  He has had 3 COVID vaccines, I will do his PSA with blood work and he still has not sent in his Cologuard but states he plans to do that this week.  He needs to schedule an eye exam    Patient since has been off work has gained 48 pounds and he is aware that he needs to go back on a diet.  He also stopped taking his ADHD medication which helps to curb his appetite we placed him back on his medication and patient is going to start dieting so he will not be still have a when he returns to work          Diet and exercise  Restart Adderall  Fasting blood work  Sent in Cologuard  Schedule eye exam  Call if left shoulder started hurting more  Follow-up 6 months       The following portions of the patient's history were reviewed and updated as appropriate: allergies, current medications, past family history, past medical history, past social history, past surgical history and problem list.    Vitals:    06/23/22 1452   BP: 136/83   BP Location: Right arm   Patient Position: Sitting   Cuff Size: Large Adult   Pulse: 80   Resp: 16   Temp: 97.3 °F (36.3 °C)   TempSrc: Infrared   SpO2: 97%   Weight: 132 kg (290 lb)   Height: 182.9 cm (72\")     Body mass index is 39.33 kg/m².    Past Medical History:   Diagnosis Date   • ADHD " (attention deficit hyperactivity disorder)    • Hypertension      Past Surgical History:   Procedure Laterality Date   • EYE SURGERY     • HERNIA REPAIR     • SHOULDER ROTATOR CUFF REPAIR Right 02/04/2022     Family History   Problem Relation Age of Onset   • Heart disease Mother    • Diabetes Father    • Lung cancer Father      Immunization History   Administered Date(s) Administered   • COVID-19 (MODERNA) 1st, 2nd, 3rd Dose Only 03/18/2021, 04/15/2021       Hospital Outpatient Visit on 01/27/2022   Component Date Value Ref Range Status   • QT Interval 01/27/2022 361  ms Final         Review of Systems   Constitutional: Positive for unexpected weight gain.   HENT: Negative.    Respiratory: Negative.    Cardiovascular: Negative.    Gastrointestinal: Negative.    Genitourinary: Negative.    Musculoskeletal:        Bilateral shoulder pain   Neurological: Negative.    Psychiatric/Behavioral: Negative.        Objective   Physical Exam  Constitutional:       Appearance: Normal appearance.   Cardiovascular:      Rate and Rhythm: Normal rate and regular rhythm.      Pulses: Normal pulses.      Heart sounds: Normal heart sounds.   Pulmonary:      Effort: Pulmonary effort is normal.      Breath sounds: Normal breath sounds.   Abdominal:      General: Bowel sounds are normal.   Musculoskeletal:      Comments: Mild pain in both shoulders   Skin:     General: Skin is warm and dry.   Neurological:      General: No focal deficit present.      Mental Status: He is alert and oriented to person, place, and time.   Psychiatric:         Mood and Affect: Mood normal.         Behavior: Behavior normal.         Procedures    Assessment & Plan   Diagnoses and all orders for this visit:    1. Mixed hyperlipidemia (Primary)  -     Lipid Panel With LDL / HDL Ratio; Future    2. Polycythemia  -     CBC & Differential; Future    3. Lipid screening    4. Screening PSA (prostate specific antigen)  -     PSA Screen; Future    5. Preventative  health care  -     Comprehensive Metabolic Panel; Future    6. Attention deficit disorder (ADD) without hyperactivity  -     amphetamine-dextroamphetamine (Adderall) 12.5 MG tablet; Take 1 tablet by mouth 2 (Two) Times a Day.  Dispense: 60 tablet; Refill: 0    7. Family history of diabetes mellitus  -     Hemoglobin A1c; Future    8. Class 2 severe obesity due to excess calories with serious comorbidity and body mass index (BMI) of 39.0 to 39.9 in adult (HCC)    9. Smoker    Other orders  -     triamterene-hydrochlorothiazide (DYAZIDE) 37.5-25 MG per capsule; Take 1 capsule by mouth Daily.  Dispense: 90 capsule; Refill: 1          Current Outpatient Medications:   •  amphetamine-dextroamphetamine (Adderall) 12.5 MG tablet, Take 1 tablet by mouth 2 (Two) Times a Day., Disp: 60 tablet, Rfl: 0  •  sildenafil (Viagra) 100 MG tablet, Take 1 tablet by mouth Daily As Needed for Erectile Dysfunction., Disp: 15 tablet, Rfl: 0  •  triamterene-hydrochlorothiazide (DYAZIDE) 37.5-25 MG per capsule, Take 1 capsule by mouth Daily., Disp: 90 capsule, Rfl: 1           Mouna Harrington, BRADEN 6/23/2022 15:12 EDT  This note has been electronically signed

## 2022-06-27 ENCOUNTER — TREATMENT (OUTPATIENT)
Dept: PHYSICAL THERAPY | Facility: CLINIC | Age: 57
End: 2022-06-27

## 2022-06-27 DIAGNOSIS — Z98.890 HX OF SHOULDER SURGERY: Primary | ICD-10-CM

## 2022-06-27 DIAGNOSIS — Z98.890 S/P ROTATOR CUFF REPAIR: ICD-10-CM

## 2022-06-27 DIAGNOSIS — S49.91XA INJURY OF RIGHT SHOULDER, INITIAL ENCOUNTER: ICD-10-CM

## 2022-06-27 PROCEDURE — 97545 WORK HARDENING: CPT | Performed by: PHYSICAL THERAPIST

## 2022-06-27 PROCEDURE — 97530 THERAPEUTIC ACTIVITIES: CPT | Performed by: PHYSICAL THERAPIST

## 2022-06-27 PROCEDURE — 97110 THERAPEUTIC EXERCISES: CPT | Performed by: PHYSICAL THERAPIST

## 2022-06-29 ENCOUNTER — TREATMENT (OUTPATIENT)
Dept: PHYSICAL THERAPY | Facility: CLINIC | Age: 57
End: 2022-06-29

## 2022-06-29 DIAGNOSIS — Z98.890 HX OF SHOULDER SURGERY: Primary | ICD-10-CM

## 2022-06-29 DIAGNOSIS — S49.91XA INJURY OF RIGHT SHOULDER, INITIAL ENCOUNTER: ICD-10-CM

## 2022-06-29 DIAGNOSIS — Z98.890 S/P ROTATOR CUFF REPAIR: ICD-10-CM

## 2022-06-29 PROCEDURE — 97530 THERAPEUTIC ACTIVITIES: CPT | Performed by: PHYSICAL THERAPIST

## 2022-06-29 PROCEDURE — 97110 THERAPEUTIC EXERCISES: CPT | Performed by: PHYSICAL THERAPIST

## 2022-07-20 ENCOUNTER — NURSE NAVIGATOR (OUTPATIENT)
Dept: ONCOLOGY | Facility: CLINIC | Age: 57
End: 2022-07-20

## 2022-07-20 NOTE — PROGRESS NOTES
July 20, 2022    Kendell Mccall  [address] [id number]    Dear Kendell,    Our records indicate that an imaging exam was performed during a recent visit to our facility. This showed a finding that may require additional testing or follow-up. It appears that you have not had the recommended testing at any of our facilities.     If you haven’t done so, please discuss this matter with your primary care provider. Your doctor can discuss your results and order any additional testing if needed. If you have any questions or would like assistance in finding a primary care provider in your area, please contact me.       Shi Vela RN, BSN, OCN, CRNI, CCM  Lung Nurse Navigator  [facility]  [phone]  [email or web address]

## 2022-08-09 DIAGNOSIS — F98.8 ATTENTION DEFICIT DISORDER (ADD) WITHOUT HYPERACTIVITY: ICD-10-CM

## 2022-08-09 RX ORDER — DEXTROAMPHETAMINE SACCHARATE, AMPHETAMINE ASPARTATE, DEXTROAMPHETAMINE SULFATE AND AMPHETAMINE SULFATE 3.125; 3.125; 3.125; 3.125 MG/1; MG/1; MG/1; MG/1
12.5 TABLET ORAL 2 TIMES DAILY
Qty: 60 TABLET | Refills: 0 | Status: SHIPPED | OUTPATIENT
Start: 2022-08-09 | End: 2022-11-02 | Stop reason: SDUPTHER

## 2022-08-09 NOTE — TELEPHONE ENCOUNTER
Caller: Kendell Mccall    Relationship: Self    Best call back number: [phone]    Requested Prescriptions:   Requested Prescriptions     Pending Prescriptions Disp Refills   • amphetamine-dextroamphetamine (Adderall) 12.5 MG tablet 60 tablet 0     Sig: Take 1 tablet by mouth 2 (Two) Times a Day.        Pharmacy where request should be sent: [facility] [address] [address] - [phone]  - [phone] FX     Additional details provided by patient: PATIENT HAS ABOUT 4 DOSES OF THIS MEDICATION LEFT.    Does the patient have less than a 3 day supply:  [] Yes  [x] No    APRIL Parrish HOFF Rep   08/09/22 09:55 EDT

## 2022-11-02 ENCOUNTER — OFFICE VISIT (OUTPATIENT)
Dept: FAMILY MEDICINE CLINIC | Facility: CLINIC | Age: 57
End: 2022-11-02

## 2022-11-02 VITALS
BODY MASS INDEX: 34.27 KG/M2 | HEART RATE: 66 BPM | DIASTOLIC BLOOD PRESSURE: 85 MMHG | WEIGHT: 253 LBS | SYSTOLIC BLOOD PRESSURE: 149 MMHG | OXYGEN SATURATION: 97 % | TEMPERATURE: 97.6 F | HEIGHT: 72 IN

## 2022-11-02 DIAGNOSIS — F98.8 ATTENTION DEFICIT DISORDER (ADD) WITHOUT HYPERACTIVITY: ICD-10-CM

## 2022-11-02 DIAGNOSIS — E78.2 MIXED HYPERLIPIDEMIA: Primary | ICD-10-CM

## 2022-11-02 DIAGNOSIS — Z12.11 COLON CANCER SCREENING: ICD-10-CM

## 2022-11-02 DIAGNOSIS — R74.8 ELEVATED LIVER ENZYMES: ICD-10-CM

## 2022-11-02 DIAGNOSIS — L98.9 SKIN LESION: ICD-10-CM

## 2022-11-02 PROCEDURE — 99213 OFFICE O/P EST LOW 20 MIN: CPT | Performed by: NURSE PRACTITIONER

## 2022-11-02 RX ORDER — DEXTROAMPHETAMINE SACCHARATE, AMPHETAMINE ASPARTATE, DEXTROAMPHETAMINE SULFATE AND AMPHETAMINE SULFATE 3.125; 3.125; 3.125; 3.125 MG/1; MG/1; MG/1; MG/1
12.5 TABLET ORAL 2 TIMES DAILY
Qty: 60 TABLET | Refills: 0 | Status: SHIPPED | OUTPATIENT
Start: 2022-11-02 | End: 2022-12-20 | Stop reason: SDUPTHER

## 2022-11-02 NOTE — PATIENT INSTRUCTIONS
Blood work  Home Cologuard  Dermatology referral  Continue diet and exercise  Consider getting new COVID-vaccine  Follow-up first of the year

## 2022-11-02 NOTE — PROGRESS NOTES
"    Kendell Mccall is a 56 y.o. male.     History of Present Illness  56-year-old white male smoker with ADHD, chronic low back pain with hypertension who comes in today for follow-up visit    Blood pressure 148/64 heart rate 66 he denies any chest pain, dyspnea, tachycardia or dizziness    Patient has a very large mole on right temple and is requesting to have it removed we will set him up with dermatology  At his request    Patient had some mild liver enzyme elevation last blood work after gaining 48 pounds we will recheck to make sure that is not worsening we will also recheck A1c    Patient has successfully lost 37 of the 48 pounds that he gained with a BMI of 34.3.  He has had 3 COVID vaccines and is thinking about getting the variant.  He is up-to-date on eye exam PSA and I am going to reorder him another Cologuard since he did not send the original 1 and and it is           Blood work  Home Cologuard  Dermatology referral  Continue diet and exercise  Consider getting new COVID-vaccine  Follow-up first of the year       The following portions of the patient's history were reviewed and updated as appropriate: allergies, current medications, past family history, past medical history, past social history, past surgical history and problem list.    Vitals:    22 1059   BP: 149/85   BP Location: Right arm   Patient Position: Sitting   Cuff Size: Large Adult   Pulse: 66   Temp: 97.6 °F (36.4 °C)   TempSrc: Temporal   SpO2: 97%   Weight: 115 kg (253 lb)   Height: 182.9 cm (72\")     Body mass index is 34.31 kg/m².    Past Medical History:   Diagnosis Date   • ADHD (attention deficit hyperactivity disorder)    • Hypertension      Past Surgical History:   Procedure Laterality Date   • EYE SURGERY     • HERNIA REPAIR     • SHOULDER ROTATOR CUFF REPAIR Right 2022     Family History   Problem Relation Age of Onset   • Heart disease Mother    • Diabetes Father    • Lung cancer Father      Immunization " History   Administered Date(s) Administered   • COVID-19 (MODERNA) 1st, 2nd, 3rd Dose Only 03/18/2021, 04/15/2021, 12/01/2021   • Influenza, Unspecified 12/01/2021   • Shingrix 12/01/2021       Results Encounter on 06/28/2022   Component Date Value Ref Range Status   • WBC 06/28/2022 9.4  3.4 - 10.8 x10E3/uL Final   • RBC 06/28/2022 5.93 (H)  4.14 - 5.80 x10E6/uL Final   • Hemoglobin 06/28/2022 17.7  13.0 - 17.7 g/dL Final   • Hematocrit 06/28/2022 53.2 (H)  37.5 - 51.0 % Final   • MCV 06/28/2022 90  79 - 97 fL Final   • MCH 06/28/2022 29.8  26.6 - 33.0 pg Final   • MCHC 06/28/2022 33.3  31.5 - 35.7 g/dL Final   • RDW 06/28/2022 13.1  11.6 - 15.4 % Final   • Platelets 06/28/2022 212  150 - 450 x10E3/uL Final   • Neutrophil Rel % 06/28/2022 73  Not Estab. % Final   • Lymphocyte Rel % 06/28/2022 16  Not Estab. % Final   • Monocyte Rel % 06/28/2022 8  Not Estab. % Final   • Eosinophil Rel % 06/28/2022 3  Not Estab. % Final   • Basophil Rel % 06/28/2022 0  Not Estab. % Final   • Neutrophils Absolute 06/28/2022 6.8  1.4 - 7.0 x10E3/uL Final   • Lymphocytes Absolute 06/28/2022 1.5  0.7 - 3.1 x10E3/uL Final   • Monocytes Absolute 06/28/2022 0.7  0.1 - 0.9 x10E3/uL Final   • Eosinophils Absolute 06/28/2022 0.3  0.0 - 0.4 x10E3/uL Final   • Basophils Absolute 06/28/2022 0.0  0.0 - 0.2 x10E3/uL Final   • Immature Granulocyte Rel % 06/28/2022 0  Not Estab. % Final   • Immature Grans Absolute 06/28/2022 0.0  0.0 - 0.1 x10E3/uL Final   • Glucose 06/28/2022 99  65 - 99 mg/dL Final   • BUN 06/28/2022 13  6 - 24 mg/dL Final   • Creatinine 06/28/2022 1.08  0.76 - 1.27 mg/dL Final   • EGFR Result 06/28/2022 81  >59 mL/min/1.73 Final   • BUN/Creatinine Ratio 06/28/2022 12  9 - 20 Final   • Sodium 06/28/2022 141  134 - 144 mmol/L Final   • Potassium 06/28/2022 3.9  3.5 - 5.2 mmol/L Final   • Chloride 06/28/2022 103  96 - 106 mmol/L Final   • Total CO2 06/28/2022 22  20 - 29 mmol/L Final   • Calcium 06/28/2022 9.3  8.7 - 10.2 mg/dL  Final   • Total Protein 06/28/2022 6.7  6.0 - 8.5 g/dL Final   • Albumin 06/28/2022 4.6  3.8 - 4.9 g/dL Final   • Globulin 06/28/2022 2.1  1.5 - 4.5 g/dL Final   • A/G Ratio 06/28/2022 2.2  1.2 - 2.2 Final   • Total Bilirubin 06/28/2022 0.7  0.0 - 1.2 mg/dL Final   • Alkaline Phosphatase 06/28/2022 122 (H)  44 - 121 IU/L Final   • AST (SGOT) 06/28/2022 42 (H)  0 - 40 IU/L Final   • ALT (SGPT) 06/28/2022 34  0 - 44 IU/L Final   • Total Cholesterol 06/28/2022 188  100 - 199 mg/dL Final   • Triglycerides 06/28/2022 258 (H)  0 - 149 mg/dL Final   • HDL Cholesterol 06/28/2022 26 (L)  >39 mg/dL Final   • VLDL Cholesterol Carlos 06/28/2022 45 (H)  5 - 40 mg/dL Final   • LDL Chol Calc (NIH) 06/28/2022 117 (H)  0 - 99 mg/dL Final   • LDL/HDL RATIO 06/28/2022 4.5 (H)  0.0 - 3.6 ratio Final    Comment:                                     LDL/HDL Ratio                                              Men  Women                                1/2 Avg.Risk  1.0    1.5                                    Avg.Risk  3.6    3.2                                 2X Avg.Risk  6.2    5.0                                 3X Avg.Risk  8.0    6.1     • PSA 06/28/2022 3.4  0.0 - 4.0 ng/mL Final    Comment: Roche ECLIA methodology.  According to the American Urological Association, Serum PSA should  decrease and remain at undetectable levels after radical  prostatectomy. The AUA defines biochemical recurrence as an initial  PSA value 0.2 ng/mL or greater followed by a subsequent confirmatory  PSA value 0.2 ng/mL or greater.  Values obtained with different assay methods or kits cannot be used  interchangeably. Results cannot be interpreted as absolute evidence  of the presence or absence of malignant disease.     • Hemoglobin A1C 06/28/2022 5.4  4.8 - 5.6 % Final    Comment:          Prediabetes: 5.7 - 6.4           Diabetes: >6.4           Glycemic control for adults with diabetes: <7.0           Review of Systems   Constitutional: Negative.    HENT:  Negative.    Respiratory: Negative.    Cardiovascular: Negative.    Gastrointestinal: Negative.    Genitourinary: Negative.    Musculoskeletal: Negative.    Skin: Positive for skin lesions.   Neurological: Negative.    Psychiatric/Behavioral: Negative.        Objective   Physical Exam  Constitutional:       Appearance: Normal appearance.   Cardiovascular:      Rate and Rhythm: Normal rate and regular rhythm.      Pulses: Normal pulses.      Heart sounds: Normal heart sounds.   Pulmonary:      Effort: Pulmonary effort is normal.      Breath sounds: Normal breath sounds.   Abdominal:      General: Bowel sounds are normal.   Musculoskeletal:         General: Normal range of motion.   Skin:     General: Skin is warm and dry.      Comments: Very large pink mole on right temple area of face   Neurological:      General: No focal deficit present.      Mental Status: He is alert and oriented to person, place, and time.   Psychiatric:         Mood and Affect: Mood normal.         Behavior: Behavior normal.          Procedures    Assessment & Plan   Diagnoses and all orders for this visit:    1. Mixed hyperlipidemia (Primary)  -     Lipid Panel With LDL / HDL Ratio; Future    2. Attention deficit disorder (ADD) without hyperactivity  -     amphetamine-dextroamphetamine (Adderall) 12.5 MG tablet; Take 1 tablet by mouth 2 (Two) Times a Day.  Dispense: 60 tablet; Refill: 0    3. Elevated liver enzymes  -     Comprehensive Metabolic Panel; Future    4. Colon cancer screening  -     Cologuard - Stool, Per Rectum; Future    5. Skin lesion  -     Ambulatory Referral to Dermatology          Current Outpatient Medications:   •  amphetamine-dextroamphetamine (Adderall) 12.5 MG tablet, Take 1 tablet by mouth 2 (Two) Times a Day., Disp: 60 tablet, Rfl: 0  •  sildenafil (Viagra) 100 MG tablet, Take 1 tablet by mouth Daily As Needed for Erectile Dysfunction., Disp: 15 tablet, Rfl: 0  •  triamterene-hydrochlorothiazide (DYAZIDE) 37.5-25 MG  per capsule, Take 1 capsule by mouth Daily., Disp: 90 capsule, Rfl: 1           BRADEN Yousif 11/2/2022 12:26 EDT  This note has been electronically signed

## 2022-11-28 ENCOUNTER — DOCUMENTATION (OUTPATIENT)
Dept: PHYSICAL THERAPY | Facility: CLINIC | Age: 57
End: 2022-11-28

## 2022-11-28 DIAGNOSIS — Z98.890 HX OF SHOULDER SURGERY: Primary | ICD-10-CM

## 2022-11-28 NOTE — PROGRESS NOTES
Physical Therapy Discharge Summary    [facility]    [address]   [place] [address]    Patient:Kendell Mccall  : 1965  Diagnosis/ICD-10 Code: Hx of shoulder surgery [Z98.890]  Referring Practitioner: No ref. provider found  Date of Initial Visit: 2022  Today's Date: 2022  Patient was seen for Visit count could not be calculated. Make sure you are using a visit which is associated with an episode. sessions    Visit Diagnoses:    ICD-10-CM ICD-9-CM   1. Hx of shoulder surgery  Z98.890 V45.89       Pt was not seen in the clinic today for a formal re-assessment for d/c.      Chepe Cheng, PT  Physical Therapist  [place] License #: [id number]

## 2022-12-20 DIAGNOSIS — F98.8 ATTENTION DEFICIT DISORDER (ADD) WITHOUT HYPERACTIVITY: ICD-10-CM

## 2022-12-20 NOTE — TELEPHONE ENCOUNTER
Caller: Kendell Mccall    Relationship: Self    Best call back number: [phone]    Requested Prescriptions:   Requested Prescriptions     Pending Prescriptions Disp Refills   • amphetamine-dextroamphetamine (Adderall) 12.5 MG tablet 60 tablet 0     Sig: Take 1 tablet by mouth 2 (Two) Times a Day.        Pharmacy where request should be sent: [facility] [address] [address] - [phone]  - [phone] FX     Additional details provided by patient:     Does the patient have less than a 3 day supply:  [x] Yes  [] No    Would you like a call back once the refill request has been completed: [] Yes [] No    If the office needs to give you a call back, can they leave a voicemail: [] Yes [] No    Parrish Gonzalez Rep   12/20/22 16:07 EST

## 2022-12-21 RX ORDER — DEXTROAMPHETAMINE SACCHARATE, AMPHETAMINE ASPARTATE, DEXTROAMPHETAMINE SULFATE AND AMPHETAMINE SULFATE 3.125; 3.125; 3.125; 3.125 MG/1; MG/1; MG/1; MG/1
12.5 TABLET ORAL 2 TIMES DAILY
Qty: 60 TABLET | Refills: 0 | Status: SHIPPED | OUTPATIENT
Start: 2022-12-21 | End: 2023-02-16 | Stop reason: SDUPTHER

## 2023-02-16 ENCOUNTER — OFFICE VISIT (OUTPATIENT)
Dept: FAMILY MEDICINE CLINIC | Facility: CLINIC | Age: 58
End: 2023-02-16
Payer: OTHER MISCELLANEOUS

## 2023-02-16 VITALS
HEIGHT: 72 IN | TEMPERATURE: 97.3 F | RESPIRATION RATE: 18 BRPM | OXYGEN SATURATION: 98 % | HEART RATE: 84 BPM | DIASTOLIC BLOOD PRESSURE: 84 MMHG | WEIGHT: 248.8 LBS | SYSTOLIC BLOOD PRESSURE: 129 MMHG | BODY MASS INDEX: 33.7 KG/M2

## 2023-02-16 DIAGNOSIS — Z12.11 COLON CANCER SCREENING: ICD-10-CM

## 2023-02-16 DIAGNOSIS — R73.09 ELEVATED GLUCOSE: ICD-10-CM

## 2023-02-16 DIAGNOSIS — F17.200 SMOKER: ICD-10-CM

## 2023-02-16 DIAGNOSIS — E66.09 CLASS 1 OBESITY DUE TO EXCESS CALORIES WITH SERIOUS COMORBIDITY AND BODY MASS INDEX (BMI) OF 33.0 TO 33.9 IN ADULT: ICD-10-CM

## 2023-02-16 DIAGNOSIS — D75.1 POLYCYTHEMIA: Primary | ICD-10-CM

## 2023-02-16 DIAGNOSIS — F98.8 ATTENTION DEFICIT DISORDER (ADD) WITHOUT HYPERACTIVITY: ICD-10-CM

## 2023-02-16 PROBLEM — E66.811 CLASS 1 OBESITY DUE TO EXCESS CALORIES WITH SERIOUS COMORBIDITY AND BODY MASS INDEX (BMI) OF 33.0 TO 33.9 IN ADULT: Status: ACTIVE | Noted: 2023-02-16

## 2023-02-16 PROCEDURE — 99213 OFFICE O/P EST LOW 20 MIN: CPT | Performed by: NURSE PRACTITIONER

## 2023-02-16 RX ORDER — DEXTROAMPHETAMINE SACCHARATE, AMPHETAMINE ASPARTATE, DEXTROAMPHETAMINE SULFATE AND AMPHETAMINE SULFATE 3.125; 3.125; 3.125; 3.125 MG/1; MG/1; MG/1; MG/1
12.5 TABLET ORAL 2 TIMES DAILY
Qty: 60 TABLET | Refills: 0 | Status: SHIPPED | OUTPATIENT
Start: 2023-02-16 | End: 2023-03-20 | Stop reason: SDUPTHER

## 2023-02-16 NOTE — PROGRESS NOTES
"    Kendell Mccall is a 57 y.o. male.     History of Present Illness  57-year-old white male smoker with ADHD, chronic low back pain with hypertension who comes in today for follow-up visit    Blood pressure 128/84 heart rate 84 he denies any chest pain, dyspnea, tachycardia or dizziness    Patient doing well on Adderall has no new problems.  He is continuing to lose weight and has lost 4 pounds since I saw him in November with a weight of 249 and a BMI of 33.7.  He has had 3 COVID vaccines and I encouraged him to get the new Pfizer vaccine that covers the variants.  He is up-to-date on eye exam PSA and I gave him a green packet to schedule his for screening colonoscopy          Fasting blood work  Schedule colonoscopy  Continue diet and exercise  Follow-up 6 months       The following portions of the patient's history were reviewed and updated as appropriate: allergies, current medications, past family history, past medical history, past social history, past surgical history and problem list.    Vitals:    02/16/23 1437   BP: 129/84   BP Location: Right arm   Patient Position: Sitting   Cuff Size: Large Adult   Pulse: 84   Resp: 18   Temp: 97.3 °F (36.3 °C)   TempSrc: Infrared   SpO2: 98%   Weight: 113 kg (248 lb 12.8 oz)   Height: 182.9 cm (72\")     Body mass index is 33.74 kg/m².    Past Medical History:   Diagnosis Date   • ADHD (attention deficit hyperactivity disorder)    • Hypertension      Past Surgical History:   Procedure Laterality Date   • EYE SURGERY     • HERNIA REPAIR     • SHOULDER ROTATOR CUFF REPAIR Right 02/04/2022     Family History   Problem Relation Age of Onset   • Heart disease Mother    • Diabetes Father    • Lung cancer Father      Immunization History   Administered Date(s) Administered   • COVID-19 (MODERNA) 1st, 2nd, 3rd Dose Only 03/18/2021, 04/15/2021, 12/01/2021   • Influenza, Unspecified 12/01/2021   • Shingrix 12/01/2021       Results Encounter on 06/28/2022   Component Date Value " Ref Range Status   • WBC 06/28/2022 9.4  3.4 - 10.8 x10E3/uL Final   • RBC 06/28/2022 5.93 (H)  4.14 - 5.80 x10E6/uL Final   • Hemoglobin 06/28/2022 17.7  13.0 - 17.7 g/dL Final   • Hematocrit 06/28/2022 53.2 (H)  37.5 - 51.0 % Final   • MCV 06/28/2022 90  79 - 97 fL Final   • MCH 06/28/2022 29.8  26.6 - 33.0 pg Final   • MCHC 06/28/2022 33.3  31.5 - 35.7 g/dL Final   • RDW 06/28/2022 13.1  11.6 - 15.4 % Final   • Platelets 06/28/2022 212  150 - 450 x10E3/uL Final   • Neutrophil Rel % 06/28/2022 73  Not Estab. % Final   • Lymphocyte Rel % 06/28/2022 16  Not Estab. % Final   • Monocyte Rel % 06/28/2022 8  Not Estab. % Final   • Eosinophil Rel % 06/28/2022 3  Not Estab. % Final   • Basophil Rel % 06/28/2022 0  Not Estab. % Final   • Neutrophils Absolute 06/28/2022 6.8  1.4 - 7.0 x10E3/uL Final   • Lymphocytes Absolute 06/28/2022 1.5  0.7 - 3.1 x10E3/uL Final   • Monocytes Absolute 06/28/2022 0.7  0.1 - 0.9 x10E3/uL Final   • Eosinophils Absolute 06/28/2022 0.3  0.0 - 0.4 x10E3/uL Final   • Basophils Absolute 06/28/2022 0.0  0.0 - 0.2 x10E3/uL Final   • Immature Granulocyte Rel % 06/28/2022 0  Not Estab. % Final   • Immature Grans Absolute 06/28/2022 0.0  0.0 - 0.1 x10E3/uL Final   • Glucose 06/28/2022 99  65 - 99 mg/dL Final   • BUN 06/28/2022 13  6 - 24 mg/dL Final   • Creatinine 06/28/2022 1.08  0.76 - 1.27 mg/dL Final   • EGFR Result 06/28/2022 81  >59 mL/min/1.73 Final   • BUN/Creatinine Ratio 06/28/2022 12  9 - 20 Final   • Sodium 06/28/2022 141  134 - 144 mmol/L Final   • Potassium 06/28/2022 3.9  3.5 - 5.2 mmol/L Final   • Chloride 06/28/2022 103  96 - 106 mmol/L Final   • Total CO2 06/28/2022 22  20 - 29 mmol/L Final   • Calcium 06/28/2022 9.3  8.7 - 10.2 mg/dL Final   • Total Protein 06/28/2022 6.7  6.0 - 8.5 g/dL Final   • Albumin 06/28/2022 4.6  3.8 - 4.9 g/dL Final   • Globulin 06/28/2022 2.1  1.5 - 4.5 g/dL Final   • A/G Ratio 06/28/2022 2.2  1.2 - 2.2 Final   • Total Bilirubin 06/28/2022 0.7  0.0 - 1.2  mg/dL Final   • Alkaline Phosphatase 06/28/2022 122 (H)  44 - 121 IU/L Final   • AST (SGOT) 06/28/2022 42 (H)  0 - 40 IU/L Final   • ALT (SGPT) 06/28/2022 34  0 - 44 IU/L Final   • Total Cholesterol 06/28/2022 188  100 - 199 mg/dL Final   • Triglycerides 06/28/2022 258 (H)  0 - 149 mg/dL Final   • HDL Cholesterol 06/28/2022 26 (L)  >39 mg/dL Final   • VLDL Cholesterol Carlos 06/28/2022 45 (H)  5 - 40 mg/dL Final   • LDL Chol Calc (NIH) 06/28/2022 117 (H)  0 - 99 mg/dL Final   • LDL/HDL RATIO 06/28/2022 4.5 (H)  0.0 - 3.6 ratio Final    Comment:                                     LDL/HDL Ratio                                              Men  Women                                1/2 Avg.Risk  1.0    1.5                                    Avg.Risk  3.6    3.2                                 2X Avg.Risk  6.2    5.0                                 3X Avg.Risk  8.0    6.1     • PSA 06/28/2022 3.4  0.0 - 4.0 ng/mL Final    Comment: Roche ECLIA methodology.  According to the American Urological Association, Serum PSA should  decrease and remain at undetectable levels after radical  prostatectomy. The AUA defines biochemical recurrence as an initial  PSA value 0.2 ng/mL or greater followed by a subsequent confirmatory  PSA value 0.2 ng/mL or greater.  Values obtained with different assay methods or kits cannot be used  interchangeably. Results cannot be interpreted as absolute evidence  of the presence or absence of malignant disease.     • Hemoglobin A1C 06/28/2022 5.4  4.8 - 5.6 % Final    Comment:          Prediabetes: 5.7 - 6.4           Diabetes: >6.4           Glycemic control for adults with diabetes: <7.0           Review of Systems   Constitutional: Negative.    HENT: Negative.    Respiratory: Negative.    Cardiovascular: Negative.    Gastrointestinal: Negative.    Genitourinary: Negative.    Musculoskeletal: Negative.    Skin: Negative.    Neurological: Negative.    Psychiatric/Behavioral: Negative.        Objective    Physical Exam  Constitutional:       Appearance: Normal appearance.   HENT:      Head: Normocephalic.   Cardiovascular:      Rate and Rhythm: Normal rate and regular rhythm.      Pulses: Normal pulses.      Heart sounds: Normal heart sounds.   Pulmonary:      Effort: Pulmonary effort is normal.      Breath sounds: Normal breath sounds.   Abdominal:      General: Bowel sounds are normal.   Musculoskeletal:         General: Normal range of motion.   Skin:     General: Skin is warm and dry.   Neurological:      General: No focal deficit present.      Mental Status: He is alert and oriented to person, place, and time.   Psychiatric:         Mood and Affect: Mood normal.         Behavior: Behavior normal.         Procedures    Assessment & Plan   Diagnoses and all orders for this visit:    1. Polycythemia (Primary)  -     CBC & Differential    2. Elevated glucose  -     Hemoglobin A1c    3. Attention deficit disorder (ADD) without hyperactivity  -     amphetamine-dextroamphetamine (Adderall) 12.5 MG tablet; Take 1 tablet by mouth 2 (Two) Times a Day.  Dispense: 60 tablet; Refill: 0    4. Smoker    5. Class 1 obesity due to excess calories with serious comorbidity and body mass index (BMI) of 33.0 to 33.9 in adult    6. Colon cancer screening  -     Ambulatory Referral to Gastroenterology          Current Outpatient Medications:   •  amphetamine-dextroamphetamine (Adderall) 12.5 MG tablet, Take 1 tablet by mouth 2 (Two) Times a Day., Disp: 60 tablet, Rfl: 0  •  triamterene-hydrochlorothiazide (DYAZIDE) 37.5-25 MG per capsule, Take 1 capsule by mouth Daily., Disp: 90 capsule, Rfl: 1  •  sildenafil (Viagra) 100 MG tablet, Take 1 tablet by mouth Daily As Needed for Erectile Dysfunction., Disp: 15 tablet, Rfl: 0           BRADEN Yousif 2/16/2023 14:58 EST  This note has been electronically signed

## 2023-02-17 LAB
BASOPHILS # BLD AUTO: 0.1 X10E3/UL (ref 0–0.2)
BASOPHILS NFR BLD AUTO: 1 %
EOSINOPHIL # BLD AUTO: 0.2 X10E3/UL (ref 0–0.4)
EOSINOPHIL NFR BLD AUTO: 3 %
ERYTHROCYTE [DISTWIDTH] IN BLOOD BY AUTOMATED COUNT: 12.4 % (ref 11.6–15.4)
HBA1C MFR BLD: 5.3 % (ref 4.8–5.6)
HCT VFR BLD AUTO: 51.3 % (ref 37.5–51)
HGB BLD-MCNC: 17.7 G/DL (ref 13–17.7)
IMM GRANULOCYTES # BLD AUTO: 0 X10E3/UL (ref 0–0.1)
IMM GRANULOCYTES NFR BLD AUTO: 0 %
LYMPHOCYTES # BLD AUTO: 1.5 X10E3/UL (ref 0.7–3.1)
LYMPHOCYTES NFR BLD AUTO: 21 %
MCH RBC QN AUTO: 30.3 PG (ref 26.6–33)
MCHC RBC AUTO-ENTMCNC: 34.5 G/DL (ref 31.5–35.7)
MCV RBC AUTO: 88 FL (ref 79–97)
MONOCYTES # BLD AUTO: 0.5 X10E3/UL (ref 0.1–0.9)
MONOCYTES NFR BLD AUTO: 7 %
NEUTROPHILS # BLD AUTO: 5.1 X10E3/UL (ref 1.4–7)
NEUTROPHILS NFR BLD AUTO: 68 %
PLATELET # BLD AUTO: 204 X10E3/UL (ref 150–450)
RBC # BLD AUTO: 5.84 X10E6/UL (ref 4.14–5.8)
WBC # BLD AUTO: 7.3 X10E3/UL (ref 3.4–10.8)

## 2023-03-12 NOTE — PATIENT INSTRUCTIONS
CMP today  Schedule fasting blood work when you get your insurance  Schedule colonoscopy when you get your insurance  Trazodone as directed for sleep  Monitor blood pressures and call office  Diet and exercise as discussed  Stop smoking , consider smoking cessation     Patient and/or family announced that they are leaving. They were advised to stay, advised to return if worse./Patient's chart was referred to charge nurse/supervisor for disposition of prescription and/or discharge instructions./Physician notified

## 2023-03-20 DIAGNOSIS — F98.8 ATTENTION DEFICIT DISORDER (ADD) WITHOUT HYPERACTIVITY: ICD-10-CM

## 2023-03-20 RX ORDER — DEXTROAMPHETAMINE SACCHARATE, AMPHETAMINE ASPARTATE, DEXTROAMPHETAMINE SULFATE AND AMPHETAMINE SULFATE 3.125; 3.125; 3.125; 3.125 MG/1; MG/1; MG/1; MG/1
12.5 TABLET ORAL 2 TIMES DAILY
Qty: 60 TABLET | Refills: 0 | Status: SHIPPED | OUTPATIENT
Start: 2023-03-20

## 2023-03-20 NOTE — TELEPHONE ENCOUNTER
Caller: Kendell Mccall    Relationship: Self    Best call back number: [phone]    Requested Prescriptions:   Requested Prescriptions     Pending Prescriptions Disp Refills   • amphetamine-dextroamphetamine (Adderall) 12.5 MG tablet 60 tablet 0     Sig: Take 1 tablet by mouth 2 (Two) Times a Day.        Pharmacy where request should be sent: [facility] [place] - ONE [facility] AT PORTAL TO REGISTERED [facility] SITES - [phone] [facility] [phone] FX     Additional details provided by patient: PATIENT STATES HE HAS REQUESTED THIS REFILL A FEW TIMES ALREADY. PATIENT STILL HAS NOT RECEIVED ANYTHING AND IS COMPLETELY OUT.    Does the patient have less than a 3 day supply:  [x] Yes  [] No    Would you like a call back once the refill request has been completed: [x] Yes [] No    If the office needs to give you a call back, can they leave a voicemail: [x] Yes [] No    Parrish Merrill Rep   03/20/23 13:16 EDT

## 2023-03-20 NOTE — TELEPHONE ENCOUNTER
Caller: Kendell Mccall    Relationship: Self    Best call back number: [phone]  Requested Prescriptions:   Requested Prescriptions     Pending Prescriptions Disp Refills   • amphetamine-dextroamphetamine (Adderall) 12.5 MG tablet 60 tablet 0     Sig: Take 1 tablet by mouth 2 (Two) Times a Day.        Pharmacy where request should be sent: [facility] [address] [address] - [phone]  - [phone] FX     Does the patient have less than a 3 day supply:  [x] Yes  [] No    Would you like a call back once the refill request has been completed: [x] Yes [] No    If the office needs to give you a call back, can they leave a voicemail: [x] Yes [] No    Parrish Rausch Rep   03/20/23 12:53 EDT

## 2023-03-22 NOTE — TELEPHONE ENCOUNTER
Caller: Kendell Mccall    Relationship: Self    Best call back number: [phone] (Mobile)      What was the call regarding:    [facility]S [facility]  [facility]   [id number] - [address] - [phone]  - [phone]   [phone]        Do you require a callback: YES

## 2023-03-28 RX ORDER — DEXTROAMPHETAMINE SACCHARATE, AMPHETAMINE ASPARTATE, DEXTROAMPHETAMINE SULFATE AND AMPHETAMINE SULFATE 3.125; 3.125; 3.125; 3.125 MG/1; MG/1; MG/1; MG/1
12.5 TABLET ORAL 2 TIMES DAILY
Qty: 60 TABLET | Refills: 0 | Status: SHIPPED | OUTPATIENT
Start: 2023-03-28

## 2023-03-28 NOTE — TELEPHONE ENCOUNTER
Pt is completely out of medication.  The refill was sent to [facility] mail order.  It needs to go to [facility] in [place].

## 2023-04-05 RX ORDER — TRIAMTERENE AND HYDROCHLOROTHIAZIDE 37.5; 25 MG/1; MG/1
CAPSULE ORAL
Qty: 90 CAPSULE | Refills: 1 | Status: SHIPPED | OUTPATIENT
Start: 2023-04-05

## 2023-05-04 DIAGNOSIS — F98.8 ATTENTION DEFICIT DISORDER (ADD) WITHOUT HYPERACTIVITY: ICD-10-CM

## 2023-05-04 RX ORDER — DEXTROAMPHETAMINE SACCHARATE, AMPHETAMINE ASPARTATE, DEXTROAMPHETAMINE SULFATE AND AMPHETAMINE SULFATE 3.125; 3.125; 3.125; 3.125 MG/1; MG/1; MG/1; MG/1
12.5 TABLET ORAL 2 TIMES DAILY
Qty: 60 TABLET | Refills: 0 | Status: SHIPPED | OUTPATIENT
Start: 2023-05-04

## 2023-05-04 NOTE — TELEPHONE ENCOUNTER
Caller: Kendell Mccall    Relationship: Self    Best call back number: [phone]     Requested Prescriptions:   Requested Prescriptions     Pending Prescriptions Disp Refills   • amphetamine-dextroamphetamine (Adderall) 12.5 MG tablet 60 tablet 0     Sig: Take 1 tablet by mouth 2 (Two) Times a Day.        Pharmacy where request should be sent: [facility] [address] [address] - [phone]  - [phone]      Last office visit with prescribing clinician: 2/16/2023   Last telemedicine visit with prescribing clinician: Visit date not found   Next office visit with prescribing clinician: Visit date not found     Additional details provided by patient: 3 DAYS OF MEDICATION REMAINING    Does the patient have less than a 3 day supply:  [x] Yes  [] No    Would you like a call back once the refill request has been completed: [] Yes [] No    If the office needs to give you a call back, can they leave a voicemail: [] Yes [] No    Zita Kent   05/04/23 13:04 EDT

## 2023-07-29 DIAGNOSIS — F98.8 ATTENTION DEFICIT DISORDER (ADD) WITHOUT HYPERACTIVITY: ICD-10-CM

## 2023-08-03 RX ORDER — AMLODIPINE BESYLATE 5 MG/1
TABLET ORAL
Qty: 30 TABLET | Refills: 2 | Status: SHIPPED | OUTPATIENT
Start: 2023-08-03

## 2023-08-03 RX ORDER — DEXTROAMPHETAMINE SACCHARATE, AMPHETAMINE ASPARTATE, DEXTROAMPHETAMINE SULFATE AND AMPHETAMINE SULFATE 3.125; 3.125; 3.125; 3.125 MG/1; MG/1; MG/1; MG/1
12.5 TABLET ORAL 2 TIMES DAILY
Qty: 60 TABLET | Refills: 0 | Status: SHIPPED | OUTPATIENT
Start: 2023-08-03

## 2023-10-26 ENCOUNTER — OFFICE VISIT (OUTPATIENT)
Dept: FAMILY MEDICINE CLINIC | Facility: CLINIC | Age: 58
End: 2023-10-26
Payer: COMMERCIAL

## 2023-10-26 VITALS
OXYGEN SATURATION: 96 % | SYSTOLIC BLOOD PRESSURE: 128 MMHG | BODY MASS INDEX: 32.64 KG/M2 | TEMPERATURE: 97.2 F | DIASTOLIC BLOOD PRESSURE: 78 MMHG | HEART RATE: 74 BPM | WEIGHT: 241 LBS | HEIGHT: 72 IN

## 2023-10-26 DIAGNOSIS — E66.09 CLASS 1 OBESITY DUE TO EXCESS CALORIES WITH SERIOUS COMORBIDITY AND BODY MASS INDEX (BMI) OF 32.0 TO 32.9 IN ADULT: ICD-10-CM

## 2023-10-26 DIAGNOSIS — Z79.899 ENCOUNTER FOR LONG-TERM (CURRENT) USE OF OTHER MEDICATIONS: Primary | ICD-10-CM

## 2023-10-26 DIAGNOSIS — F98.8 ATTENTION DEFICIT DISORDER (ADD) WITHOUT HYPERACTIVITY: ICD-10-CM

## 2023-10-26 DIAGNOSIS — F17.200 TOBACCO DEPENDENCE SYNDROME: ICD-10-CM

## 2023-10-26 DIAGNOSIS — D75.1 POLYCYTHEMIA: ICD-10-CM

## 2023-10-26 DIAGNOSIS — Z23 NEED FOR INFLUENZA VACCINATION: ICD-10-CM

## 2023-10-26 DIAGNOSIS — Z23 NEED FOR PNEUMOCOCCAL 20-VALENT CONJUGATE VACCINATION: ICD-10-CM

## 2023-10-26 DIAGNOSIS — E78.2 MIXED HYPERLIPIDEMIA: ICD-10-CM

## 2023-10-26 DIAGNOSIS — Z00.00 PREVENTATIVE HEALTH CARE: ICD-10-CM

## 2023-10-26 DIAGNOSIS — Z12.5 SCREENING PSA (PROSTATE SPECIFIC ANTIGEN): ICD-10-CM

## 2023-10-26 PROBLEM — E66.811 CLASS 1 OBESITY DUE TO EXCESS CALORIES WITH SERIOUS COMORBIDITY AND BODY MASS INDEX (BMI) OF 32.0 TO 32.9 IN ADULT: Status: ACTIVE | Noted: 2023-10-26

## 2023-10-26 RX ORDER — DEXTROAMPHETAMINE SACCHARATE, AMPHETAMINE ASPARTATE, DEXTROAMPHETAMINE SULFATE AND AMPHETAMINE SULFATE 3.125; 3.125; 3.125; 3.125 MG/1; MG/1; MG/1; MG/1
12.5 TABLET ORAL 2 TIMES DAILY
Qty: 60 TABLET | Refills: 0 | Status: SHIPPED | OUTPATIENT
Start: 2023-10-26

## 2023-10-26 NOTE — PATIENT INSTRUCTIONS
Influenza vaccine today  Fasting blood work  Oncology referral  Urine drug screen  Narcotic contract signed  Diet and exercise  Follow-up 6 months

## 2023-10-26 NOTE — PROGRESS NOTES
"    Kendell Mccall is a 57 y.o. male.     History of Present Illness  57-year-old white male smoker with history ADHD, chronic low back pain and hypertension who comes in today for follow-up visit and fasting blood work    Blood pressure 128/78 heart rate 74 he denies any chest pain, dyspnea, tachycardia or dizziness    Patient doing well on current Adderall dose will continue.    Patient has had abnormal CBC for several years now he is a smoker however we are going to go to oncology to make sure there is nothing else going on and I will set him up with Dr. Coates    Weight is 241 with a BMI of 32.7.  He has had 3 COVID vaccines, still need to schedule an eye exam we will be doing PSA with blood work and he still declines colon cancer screening            Influenza vaccine today  Fasting blood work  Oncology referral  Urine drug screen  Narcotic contract signed  Diet and exercise  Follow-up 6 months       The following portions of the patient's history were reviewed and updated as appropriate: allergies, current medications, past family history, past medical history, past social history, past surgical history, and problem list.    Vitals:    10/26/23 1430   BP: 128/78   BP Location: Right arm   Patient Position: Sitting   Cuff Size: Adult   Pulse: 74   Temp: 97.2 °F (36.2 °C)   TempSrc: Infrared   SpO2: 96%   Weight: 109 kg (241 lb)   Height: 182.9 cm (72.01\")     Body mass index is 32.68 kg/m².    Past Medical History:   Diagnosis Date    ADHD (attention deficit hyperactivity disorder)     Hypertension      Past Surgical History:   Procedure Laterality Date    EYE SURGERY      HERNIA REPAIR      SHOULDER ROTATOR CUFF REPAIR Right 02/04/2022     Family History   Problem Relation Age of Onset    Heart disease Mother     Diabetes Father     Lung cancer Father      Immunization History   Administered Date(s) Administered    COVID-19 (MODERNA) 1st,2nd,3rd Dose Monovalent 03/18/2021, 04/15/2021, 12/01/2021    Influenza, " Unspecified 12/01/2021    Shingrix 12/01/2021       Office Visit on 02/16/2023   Component Date Value Ref Range Status    Hemoglobin A1C 02/16/2023 5.3  4.8 - 5.6 % Final    Comment:          Prediabetes: 5.7 - 6.4           Diabetes: >6.4           Glycemic control for adults with diabetes: <7.0      WBC 02/16/2023 7.3  3.4 - 10.8 x10E3/uL Final    RBC 02/16/2023 5.84 (H)  4.14 - 5.80 x10E6/uL Final    Hemoglobin 02/16/2023 17.7  13.0 - 17.7 g/dL Final    Hematocrit 02/16/2023 51.3 (H)  37.5 - 51.0 % Final    MCV 02/16/2023 88  79 - 97 fL Final    MCH 02/16/2023 30.3  26.6 - 33.0 pg Final    MCHC 02/16/2023 34.5  31.5 - 35.7 g/dL Final    RDW 02/16/2023 12.4  11.6 - 15.4 % Final    Platelets 02/16/2023 204  150 - 450 x10E3/uL Final    Neutrophil Rel % 02/16/2023 68  Not Estab. % Final    Lymphocyte Rel % 02/16/2023 21  Not Estab. % Final    Monocyte Rel % 02/16/2023 7  Not Estab. % Final    Eosinophil Rel % 02/16/2023 3  Not Estab. % Final    Basophil Rel % 02/16/2023 1  Not Estab. % Final    Neutrophils Absolute 02/16/2023 5.1  1.4 - 7.0 x10E3/uL Final    Lymphocytes Absolute 02/16/2023 1.5  0.7 - 3.1 x10E3/uL Final    Monocytes Absolute 02/16/2023 0.5  0.1 - 0.9 x10E3/uL Final    Eosinophils Absolute 02/16/2023 0.2  0.0 - 0.4 x10E3/uL Final    Basophils Absolute 02/16/2023 0.1  0.0 - 0.2 x10E3/uL Final    Immature Granulocyte Rel % 02/16/2023 0  Not Estab. % Final    Immature Grans Absolute 02/16/2023 0.0  0.0 - 0.1 x10E3/uL Final         Review of Systems   Constitutional: Negative.    HENT: Negative.     Respiratory: Negative.     Cardiovascular: Negative.    Gastrointestinal: Negative.    Genitourinary: Negative.    Musculoskeletal: Negative.    Skin: Negative.    Neurological: Negative.    Psychiatric/Behavioral: Negative.         Objective   Physical Exam  Constitutional:       Appearance: Normal appearance.   HENT:      Head: Normocephalic.   Cardiovascular:      Rate and Rhythm: Normal rate and regular  rhythm.      Pulses: Normal pulses.      Heart sounds: Normal heart sounds.   Pulmonary:      Effort: Pulmonary effort is normal.      Breath sounds: Normal breath sounds.   Abdominal:      General: Bowel sounds are normal.   Musculoskeletal:         General: Normal range of motion.   Skin:     General: Skin is warm.   Neurological:      General: No focal deficit present.      Mental Status: He is alert and oriented to person, place, and time.   Psychiatric:         Mood and Affect: Mood normal.         Behavior: Behavior normal.         Procedures    Assessment & Plan   Diagnoses and all orders for this visit:    1. Encounter for long-term (current) use of other medications (Primary)  -     Urine Drug Screen - Urine, Clean Catch; Future    2. Attention deficit disorder (ADD) without hyperactivity  -     amphetamine-dextroamphetamine (Adderall) 12.5 MG tablet; Take 1 tablet by mouth 2 (Two) Times a Day.  Dispense: 60 tablet; Refill: 0    3. Class 1 obesity due to excess calories with serious comorbidity and body mass index (BMI) of 32.0 to 32.9 in adult    4. Polycythemia  -     CBC & Differential; Future    5. Tobacco dependence syndrome    6. Preventative health care  -     Comprehensive Metabolic Panel; Future  -     Hemoglobin A1c; Future    7. Mixed hyperlipidemia  -     Lipid Panel With LDL / HDL Ratio; Future    8. Screening PSA (prostate specific antigen)  -     PSA Screen; Future           Current Outpatient Medications:     amLODIPine (NORVASC) 5 MG tablet, TAKE 1 TABLET BY MOUTH EVERYDAY AT BEDTIME, Disp: 30 tablet, Rfl: 2    amphetamine-dextroamphetamine (Adderall) 12.5 MG tablet, Take 1 tablet by mouth 2 (Two) Times a Day., Disp: 60 tablet, Rfl: 0    sildenafil (Viagra) 100 MG tablet, Take 1 tablet by mouth Daily As Needed for Erectile Dysfunction., Disp: 15 tablet, Rfl: 0    triamterene-hydrochlorothiazide (DYAZIDE) 37.5-25 MG per capsule, Take 1 capsule by mouth Daily., Disp: 90 capsule, Rfl: 1            Mouan Harrington, APRN 10/26/2023 14:49 EDT  This note has been electronically signed

## 2023-11-01 DIAGNOSIS — D72.829 LEUKOCYTOSIS, UNSPECIFIED TYPE: Primary | ICD-10-CM

## 2023-11-04 RX ORDER — AMLODIPINE BESYLATE 5 MG/1
TABLET ORAL
Qty: 90 TABLET | Refills: 0 | Status: SHIPPED | OUTPATIENT
Start: 2023-11-04

## 2023-12-11 ENCOUNTER — TELEPHONE (OUTPATIENT)
Dept: ONCOLOGY | Facility: CLINIC | Age: 58
End: 2023-12-11
Payer: COMMERCIAL

## 2024-01-05 NOTE — PROGRESS NOTES
HEMATOLOGY ONCOLOGY OUTPATIENT CONSULTATION       Patient name: Kendell Mccall  : 1965  MRN: [id number]  Primary Care Physician: Mouna Harrington APRN  Referring Physician: Mouna Harrington APRN  Reason For Consult: Erythrocytosis.     History of Present Illness:  Patient is a 58 y.o.     2024: For the first time in the office to investigate erythrocytosis that had been present for a long time. It never had been severe and it had never been investigated. He was rather asymptomatic at the time of the visit and, in particular, denied aquagenic pruritus or erythromelalgia. He gave a history of tobacco smoking for a long time. While he had some difficulty quantifying his consumption, he reported that he tended to consume around 1 pack per day and had been doing so since the age of 17; he had been abstinent from some periods of time. His family history was not contributory. On exam he appeared older than the stated age. He was neither jaundiced nor pale. No plethora. Lungs diminished bilaterally. Heart regular. Abdomen soft and not tender. The liver and spleen not enlarged. No edema. No skin rash. Reviewed the laboratory exams and dicussed with him. My impression is that the mild erythrocytosis is the result of his tobacco smoking. Discussed with him. To investigate.     Subjective:  2024: In the office for the first time with the above.     Past Medical History:   Diagnosis Date    ADHD (attention deficit hyperactivity disorder)     Hypertension      Past Surgical History:   Procedure Laterality Date    EYE SURGERY      HERNIA REPAIR      SHOULDER ROTATOR CUFF REPAIR Right 2022       Current Outpatient Medications:     amLODIPine (NORVASC) 5 MG tablet, TAKE 1 TABLET BY MOUTH EVERYDAY AT BEDTIME, Disp: 90 tablet, Rfl: 0    amphetamine-dextroamphetamine (Adderall) 12.5 MG tablet, Take 1 tablet by mouth 2 (Two) Times a Day., Disp: 60 tablet, Rfl: 0    sildenafil (Viagra) 100  MG tablet, Take 1 tablet by mouth Daily As Needed for Erectile Dysfunction., Disp: 15 tablet, Rfl: 0    triamterene-hydrochlorothiazide (DYAZIDE) 37.5-25 MG per capsule, Take 1 capsule by mouth Daily., Disp: 90 capsule, Rfl: 1    aspirin 81 MG EC tablet, Take 1 tablet by mouth Daily., Disp: , Rfl:     ibuprofen (ADVIL,MOTRIN) 200 MG tablet, Take 1 tablet by mouth Daily., Disp: , Rfl:     No Known Allergies    Family History   Problem Relation Age of Onset    Heart disease Mother     Diabetes Father     Lung cancer Father 72        Associated to cigarette smoking     Cancer-related family history includes Lung cancer (age of onset: 72) in his father.    Social History     Tobacco Use    Smoking status: Every Day     Packs/day: 1.00     Years: 41.00     Additional pack years: 0.00     Total pack years: 41.00     Types: Cigarettes, Cigars     Start date: 1982     Passive exposure: Current    Smokeless tobacco: Never   Vaping Use    Vaping Use: Never used   Substance Use Topics    Alcohol use: Yes     Comment: rarely    Drug use: Yes     Types: Marijuana     Social History     Social History Narrative    Not on file     ROS:   Review of Systems   Constitutional:  Negative for activity change, appetite change, chills, diaphoresis, fatigue, fever and unexpected weight change.   HENT:  Negative for congestion, dental problem, drooling, ear discharge, ear pain, facial swelling, hearing loss, mouth sores, nosebleeds, postnasal drip, rhinorrhea, sinus pressure, sinus pain, sneezing, sore throat, tinnitus, trouble swallowing and voice change.    Eyes:  Negative for photophobia, pain, discharge, redness, itching and visual disturbance.   Respiratory:  Negative for apnea, cough, choking, chest tightness, shortness of breath, wheezing and stridor.    Cardiovascular:  Negative for chest pain, palpitations and leg swelling.   Gastrointestinal:  Negative for abdominal distention, abdominal pain, anal bleeding, blood in stool,  "constipation, diarrhea, nausea, rectal pain and vomiting.   Endocrine: Negative for cold intolerance, heat intolerance, polydipsia and polyuria.   Genitourinary:  Negative for decreased urine volume, difficulty urinating, dysuria, flank pain, frequency, genital sores, hematuria and urgency.   Musculoskeletal:  Negative for arthralgias, back pain, gait problem, joint swelling, myalgias, neck pain and neck stiffness.   Skin:  Negative for color change, pallor and rash.   Neurological:  Negative for dizziness, tremors, seizures, syncope, facial asymmetry, speech difficulty, weakness, light-headedness, numbness and headaches.   Hematological:  Negative for adenopathy. Does not bruise/bleed easily.   Psychiatric/Behavioral:  Negative for agitation, behavioral problems, confusion, decreased concentration, hallucinations, self-injury, sleep disturbance and suicidal ideas. The patient is not nervous/anxious.      Objective:    Vital Signs:  Vitals:    01/09/24 1108   Pulse: 87   Temp: 97.8 °F (36.6 °C)   SpO2: 98%   Weight: 116 kg (255 lb)   Height: 182.9 cm (72\")   PainSc: 0-No pain     Body mass index is 34.58 kg/m².    ECOG  (0) Fully active, able to carry on all predisease performance without restriction    Physical Exam:   Physical Exam  Constitutional:       General: He is not in acute distress.     Appearance: He is not ill-appearing, toxic-appearing or diaphoretic.      Comments: Well built and well oriented man. Seems chronically ill and older than the stated age.    HENT:      Head: Normocephalic and atraumatic.      Right Ear: External ear normal.      Left Ear: External ear normal.      Nose: Nose normal.      Mouth/Throat:      Mouth: Mucous membranes are moist.      Pharynx: Oropharynx is clear.   Eyes:      General: No scleral icterus.        Right eye: No discharge.         Left eye: No discharge.      Conjunctiva/sclera: Conjunctivae normal.      Pupils: Pupils are equal, round, and reactive to light. "   Cardiovascular:      Rate and Rhythm: Normal rate and regular rhythm.      Pulses: Normal pulses.      Heart sounds: Normal heart sounds. No murmur heard.     No friction rub. No gallop.   Pulmonary:      Effort: No respiratory distress.      Breath sounds: No stridor. No wheezing, rhonchi or rales.   Chest:      Chest wall: No tenderness.   Abdominal:      General: Abdomen is flat. Bowel sounds are normal. There is no distension.      Palpations: Abdomen is soft. There is no mass.      Tenderness: There is no abdominal tenderness. There is no right CVA tenderness, left CVA tenderness, guarding or rebound.   Musculoskeletal:         General: No swelling, tenderness, deformity or signs of injury.      Cervical back: No rigidity.      Right lower leg: No edema.      Left lower leg: No edema.   Lymphadenopathy:      Cervical: No cervical adenopathy.   Skin:     General: Skin is warm and dry.      Coloration: Skin is not jaundiced.      Findings: No bruising or rash.   Neurological:      General: No focal deficit present.      Mental Status: He is alert and oriented to person, place, and time.      Gait: Gait normal.   Psychiatric:         Mood and Affect: Mood normal.         Behavior: Behavior normal.         Thought Content: Thought content normal.         Judgment: Judgment normal.     NAKUL Pope MD performed the physical exam on 1/9/2024 as documented above.     Lab Results - Last 18 Months   Lab Units 02/16/23  1518   WBC x10E3/uL 7.3   HEMOGLOBIN g/dL 17.7   HEMATOCRIT % 51.3*   PLATELETS x10E3/uL 204   MCV fL 88     Lab Results - Last 18 Months   Lab Units 02/16/23  1517   SODIUM mmol/L 139   POTASSIUM mmol/L 4.5   CHLORIDE mmol/L 101   CO2 mmol/L 24   BUN mg/dL 11   CREATININE mg/dL 0.81   CALCIUM mg/dL 9.4   BILIRUBIN mg/dL 0.5   ALK PHOS IU/L 105   ALT (SGPT) IU/L 19   AST (SGOT) IU/L 27   GLUCOSE mg/dL 97     Lab Results   Component Value Date    GLUCOSE 97 02/16/2023    BUN 11 02/16/2023     CREATININE 0.81 02/16/2023    EGFRIFNONA 79 01/27/2022    EGFRIFAFRI 117 09/13/2021    BCR 14 02/16/2023    K 4.5 02/16/2023    CO2 24 02/16/2023    CALCIUM 9.4 02/16/2023    PROTENTOTREF 6.6 02/16/2023    ALBUMIN 4.4 02/16/2023    LABIL2 2.0 02/16/2023    AST 27 02/16/2023    ALT 19 02/16/2023     Lab Results   Component Value Date    PSA 3.4 06/28/2022     Assessment & Plan     Erythrocytosis: Reviewed the laboratory exams and discussed at length with him. My impression is that his mild erythrocytosis is the result of his intense tobacco smoking and potentially of chronic lung disease. He has no history to suggest a myeloproliferative syndrome.   Tobacco smoking: discussed at length with him the implications of continued smoking.  Reviewed all the records including notes from primary care and all laboratory exams available.   He is to see me in 4 weeks with results.     Ramirez Pope MD on 1/9/2024 at 13:04

## 2024-01-09 ENCOUNTER — CONSULT (OUTPATIENT)
Dept: ONCOLOGY | Facility: CLINIC | Age: 59
End: 2024-01-09
Payer: COMMERCIAL

## 2024-01-09 VITALS
HEART RATE: 87 BPM | BODY MASS INDEX: 34.54 KG/M2 | HEIGHT: 72 IN | TEMPERATURE: 97.8 F | WEIGHT: 255 LBS | OXYGEN SATURATION: 98 %

## 2024-01-09 DIAGNOSIS — D75.1 POLYCYTHEMIA: Primary | ICD-10-CM

## 2024-01-09 RX ORDER — IBUPROFEN 200 MG
200 TABLET ORAL DAILY
COMMUNITY

## 2024-01-09 RX ORDER — ASPIRIN 81 MG/1
81 TABLET ORAL DAILY
COMMUNITY

## 2024-01-10 DIAGNOSIS — F98.8 ATTENTION DEFICIT DISORDER (ADD) WITHOUT HYPERACTIVITY: ICD-10-CM

## 2024-01-10 RX ORDER — DEXTROAMPHETAMINE SACCHARATE, AMPHETAMINE ASPARTATE, DEXTROAMPHETAMINE SULFATE AND AMPHETAMINE SULFATE 3.125; 3.125; 3.125; 3.125 MG/1; MG/1; MG/1; MG/1
12.5 TABLET ORAL 2 TIMES DAILY
Qty: 60 TABLET | Refills: 0 | Status: SHIPPED | OUTPATIENT
Start: 2024-01-10

## 2024-01-10 NOTE — TELEPHONE ENCOUNTER
Caller: Kendell Mccall    Relationship: Self    Best call back number: [phone]    Requested Prescriptions:   Requested Prescriptions     Pending Prescriptions Disp Refills    amphetamine-dextroamphetamine (Adderall) 12.5 MG tablet 60 tablet 0     Sig: Take 1 tablet by mouth 2 (Two) Times a Day.        Pharmacy where request should be sent: [facility] [address] [address] - [phone]  - [phone] FX     Last office visit with prescribing clinician: 10/26/2023   Last telemedicine visit with prescribing clinician: Visit date not found   Next office visit with prescribing clinician: 4/29/2024       Does the patient have less than a 3 day supply:  [] Yes  [x] No      Parrish Rausch Rep   01/10/24 10:56 EST

## 2024-01-17 LAB
ALBUMIN SERPL-MCNC: 4.6 G/DL (ref 3.8–4.9)
ALBUMIN/GLOB SERPL: 2.3 {RATIO} (ref 1.2–2.2)
ALP SERPL-CCNC: 123 IU/L (ref 44–121)
ALT SERPL-CCNC: 23 IU/L (ref 0–44)
AMBIG ABBREV CMP14 DEFAULT: NORMAL
AST SERPL-CCNC: 26 IU/L (ref 0–40)
BILIRUB SERPL-MCNC: 0.5 MG/DL (ref 0–1.2)
BUN SERPL-MCNC: 19 MG/DL (ref 6–24)
BUN/CREAT SERPL: 20 (ref 9–20)
CALCIUM SERPL-MCNC: 9.3 MG/DL (ref 8.7–10.2)
CELLS ANALYZED: 200
CELLS COUNTED: 200
CHLORIDE SERPL-SCNC: 99 MMOL/L (ref 96–106)
CHROM ANALY RESULT (ISCN): NORMAL
CLINICAL CYTOGENETICIST SPEC: NORMAL
CO2 SERPL-SCNC: 25 MMOL/L (ref 20–29)
CREAT SERPL-MCNC: 0.97 MG/DL (ref 0.76–1.27)
DIAGNOSTIC IMP SPEC-IMP: NORMAL
EGFRCR SERPLBLD CKD-EPI 2021: 91 ML/MIN/1.73
GLOBULIN SER CALC-MCNC: 2 G/DL (ref 1.5–4.5)
GLUCOSE SERPL-MCNC: 94 MG/DL (ref 70–99)
JAK2 P.V617F BLD/T QL: NORMAL
LAB DIRECTOR NAME PROVIDER: NORMAL
LABORATORY COMMENT REPORT: NORMAL
POTASSIUM SERPL-SCNC: 4.5 MMOL/L (ref 3.5–5.2)
PROT SERPL-MCNC: 6.6 G/DL (ref 6–8.5)
SODIUM SERPL-SCNC: 138 MMOL/L (ref 134–144)
SPECIMEN SOURCE: NORMAL

## 2024-01-23 RX ORDER — AMLODIPINE BESYLATE 5 MG/1
TABLET ORAL
Qty: 90 TABLET | Refills: 0 | Status: SHIPPED | OUTPATIENT
Start: 2024-01-23

## 2024-01-23 RX ORDER — TRIAMTERENE AND HYDROCHLOROTHIAZIDE 37.5; 25 MG/1; MG/1
1 CAPSULE ORAL DAILY
Qty: 90 CAPSULE | Refills: 1 | Status: SHIPPED | OUTPATIENT
Start: 2024-01-23

## 2024-02-13 ENCOUNTER — OFFICE VISIT (OUTPATIENT)
Dept: ONCOLOGY | Facility: CLINIC | Age: 59
End: 2024-02-13
Payer: COMMERCIAL

## 2024-02-13 VITALS
WEIGHT: 248 LBS | HEART RATE: 88 BPM | OXYGEN SATURATION: 99 % | HEIGHT: 72 IN | BODY MASS INDEX: 33.59 KG/M2 | TEMPERATURE: 97.7 F

## 2024-02-13 DIAGNOSIS — D75.1 POLYCYTHEMIA: Primary | ICD-10-CM

## 2024-02-14 LAB
BASOPHILS # BLD AUTO: 0.1 X10E3/UL (ref 0–0.2)
BASOPHILS NFR BLD AUTO: 1 %
EOSINOPHIL # BLD AUTO: 0.2 X10E3/UL (ref 0–0.4)
EOSINOPHIL NFR BLD AUTO: 3 %
ERYTHROCYTE [DISTWIDTH] IN BLOOD BY AUTOMATED COUNT: 12.4 % (ref 11.6–15.4)
HCT VFR BLD AUTO: 52 % (ref 37.5–51)
HGB BLD-MCNC: 17.8 G/DL (ref 13–17.7)
IMM GRANULOCYTES # BLD AUTO: 0 X10E3/UL (ref 0–0.1)
IMM GRANULOCYTES NFR BLD AUTO: 0 %
LYMPHOCYTES # BLD AUTO: 1.6 X10E3/UL (ref 0.7–3.1)
LYMPHOCYTES NFR BLD AUTO: 22 %
MCH RBC QN AUTO: 30.3 PG (ref 26.6–33)
MCHC RBC AUTO-ENTMCNC: 34.2 G/DL (ref 31.5–35.7)
MCV RBC AUTO: 88 FL (ref 79–97)
MONOCYTES # BLD AUTO: 0.6 X10E3/UL (ref 0.1–0.9)
MONOCYTES NFR BLD AUTO: 7 %
NEUTROPHILS # BLD AUTO: 5.1 X10E3/UL (ref 1.4–7)
NEUTROPHILS NFR BLD AUTO: 67 %
PLATELET # BLD AUTO: 196 X10E3/UL (ref 150–450)
RBC # BLD AUTO: 5.88 X10E6/UL (ref 4.14–5.8)
WBC # BLD AUTO: 7.5 X10E3/UL (ref 3.4–10.8)

## 2024-03-18 DIAGNOSIS — F98.8 ATTENTION DEFICIT DISORDER (ADD) WITHOUT HYPERACTIVITY: ICD-10-CM

## 2024-03-18 RX ORDER — DEXTROAMPHETAMINE SACCHARATE, AMPHETAMINE ASPARTATE, DEXTROAMPHETAMINE SULFATE AND AMPHETAMINE SULFATE 3.125; 3.125; 3.125; 3.125 MG/1; MG/1; MG/1; MG/1
12.5 TABLET ORAL 2 TIMES DAILY
Qty: 60 TABLET | Refills: 0 | Status: SHIPPED | OUTPATIENT
Start: 2024-03-18

## 2024-03-18 NOTE — TELEPHONE ENCOUNTER
Caller: Kendell Mccall    Relationship: Self    Best call back number: [phone]    Requested Prescriptions:   Requested Prescriptions     Pending Prescriptions Disp Refills    amphetamine-dextroamphetamine (Adderall) 12.5 MG tablet 60 tablet 0     Sig: Take 1 tablet by mouth 2 (Two) Times a Day.        Pharmacy where request should be sent: [facility] [address] [address] - [phone]  - [phone] FX     Last office visit with prescribing clinician: 10/26/2023   Last telemedicine visit with prescribing clinician: Visit date not found   Next office visit with prescribing clinician: 4/29/2024     Additional details provided by patient: N/A     Does the patient have less than a 3 day supply:  [x] Yes  [] No    Would you like a call back once the refill request has been completed: [x] Yes [] No    If the office needs to give you a call back, can they leave a voicemail: [] Yes [] No    Parrish Godoy Rep   03/18/24 15:44 EDT

## 2024-04-18 NOTE — PROGRESS NOTES
Physical Therapy Daily Progress Note        Patient: Kendell Mccall   : 1965  Diagnosis/ICD-10 Code:  Hx of shoulder surgery [Z98.890]  Referring practitioner: Ashu Frank MD  Date of Initial Visit: Type: THERAPY  Noted: 3/30/2022  Today's Date: 22  Patient seen for 16 sessions    DATE 2022 2022 2022 2022 3/4/2022 3/11/2022 3/18/2022 3/25/2022 2022 2022   POST OP WEEK 0 1 2 3 4 5 6 7 8 9   DATE 4/15/2022 2022 2022 2022 2022 2022 2022 6/3/2022 6/10/2022 2022   POST OP WEEK 10 11 12 13 14 15 16 17 18 19   DATE 2022 2022 2022 7/15/2022 2022 2022 2022 2022 2022 2022   POST OP WEEK 20 21 22 23 24 25 26 27 28 29     Post OP week 15 as of 2022           Subjective   Kendell Mccall reports: Shoulder continues to feel well and has not had any pain.  Notes continued weakness with overhead activities.    Objective     See Exercise, Manual, and Modality Logs for complete treatment.      Passive Range of Motion      Right Shoulder   Flexion: 175 degrees   Abduction: 175 degrees   External rotation 90°: 85 degrees   Internal rotation 90°: 85 degrees   IR BTB: L1     Progressed / advanced exercises as noted in flow sheets;      Assessment/Plan   Continues to progress very well and with good tolerance to light strengthening and stabilization.  No issues with home program.  Needs work on strengthening in elevated planes.  Improved, but needs work on IR BTB     Continue to progress in accordance with post surgical guidelines and patient tolerance. Continue to initiate more functional exercises as tolerated.             Manual Therapy:         mins  79135;  Therapeutic Exercise:    35     mins  46440;     Neuromuscular Lydia:        mins  96119;    Therapeutic Activity:     10     mins  81251;     Gait Training:           mins  61120;     Ultrasound:          mins  25245;    Electrical Stimulation:          mins  95341 ( );  Dry Needling          mins self-pay    Timed Treatment:   45   mins   Total Treatment:     45   mins    Ryan Mac PT  Physical Therapist                     [Normal Sphincter Tone] : normal sphincter tone [No Mass] : no mass [Testes Mass (___cm)] : there were no testicular masses [Prostate Enlargement] : the prostate was not enlarged [Prostate Tenderness] : the prostate was not tender [No Prostate Nodules] : no prostate nodules [Normal] : normal gait, coordination grossly intact, no focal deficits and deep tendon reflexes were 2+ and symmetric

## 2024-04-29 NOTE — TELEPHONE ENCOUNTER
MS RN NOTES

SEEN BY UROLOGIST DR RUTLEDGE,WILL DISCUSS FINDING WITH KEVIN,RECOMMENDING SURGERY CONSULT 
FOR EVALUATION NOTED.CHARGE NURSE NATHAN MCCLOUD. Needs fasting f/u ov appt

## 2024-04-30 RX ORDER — AMLODIPINE BESYLATE 5 MG/1
TABLET ORAL
Qty: 90 TABLET | Refills: 0 | OUTPATIENT
Start: 2024-04-30

## 2024-05-07 ENCOUNTER — OFFICE VISIT (OUTPATIENT)
Dept: FAMILY MEDICINE CLINIC | Facility: CLINIC | Age: 59
End: 2024-05-07

## 2024-05-07 VITALS
OXYGEN SATURATION: 95 % | HEIGHT: 72 IN | SYSTOLIC BLOOD PRESSURE: 124 MMHG | TEMPERATURE: 97.5 F | BODY MASS INDEX: 33.86 KG/M2 | DIASTOLIC BLOOD PRESSURE: 74 MMHG | HEART RATE: 85 BPM | WEIGHT: 250 LBS

## 2024-05-07 DIAGNOSIS — F17.200 SMOKER: ICD-10-CM

## 2024-05-07 DIAGNOSIS — F98.8 ATTENTION DEFICIT DISORDER (ADD) WITHOUT HYPERACTIVITY: ICD-10-CM

## 2024-05-07 DIAGNOSIS — E66.09 CLASS 1 OBESITY DUE TO EXCESS CALORIES WITH SERIOUS COMORBIDITY AND BODY MASS INDEX (BMI) OF 33.0 TO 33.9 IN ADULT: Primary | ICD-10-CM

## 2024-05-07 PROBLEM — E66.811 CLASS 1 OBESITY DUE TO EXCESS CALORIES WITH SERIOUS COMORBIDITY AND BODY MASS INDEX (BMI) OF 33.0 TO 33.9 IN ADULT: Status: ACTIVE | Noted: 2024-05-07

## 2024-05-07 PROCEDURE — 99213 OFFICE O/P EST LOW 20 MIN: CPT | Performed by: NURSE PRACTITIONER

## 2024-05-07 RX ORDER — TRIAMTERENE AND HYDROCHLOROTHIAZIDE 37.5; 25 MG/1; MG/1
1 CAPSULE ORAL DAILY
Qty: 90 CAPSULE | Refills: 0 | Status: SHIPPED | OUTPATIENT
Start: 2024-05-07

## 2024-05-07 RX ORDER — DEXTROAMPHETAMINE SACCHARATE, AMPHETAMINE ASPARTATE, DEXTROAMPHETAMINE SULFATE AND AMPHETAMINE SULFATE 3.125; 3.125; 3.125; 3.125 MG/1; MG/1; MG/1; MG/1
12.5 TABLET ORAL 2 TIMES DAILY
Qty: 180 TABLET | Refills: 0 | Status: SHIPPED | OUTPATIENT
Start: 2024-05-07

## 2024-05-07 RX ORDER — AMLODIPINE BESYLATE 5 MG/1
5 TABLET ORAL
Qty: 90 TABLET | Refills: 0 | Status: SHIPPED | OUTPATIENT
Start: 2024-05-07

## 2024-05-29 RX ORDER — AMLODIPINE BESYLATE 5 MG/1
5 TABLET ORAL
Qty: 90 TABLET | Refills: 0 | Status: SHIPPED | OUTPATIENT
Start: 2024-05-29

## 2025-04-29 ENCOUNTER — NURSE TRIAGE (OUTPATIENT)
Dept: CALL CENTER | Facility: HOSPITAL | Age: 60
End: 2025-04-29
Payer: OTHER MISCELLANEOUS

## 2025-04-29 ENCOUNTER — HOSPITAL ENCOUNTER (OUTPATIENT)
Facility: HOSPITAL | Age: 60
Discharge: HOME | End: 2025-04-30
Attending: EMERGENCY MEDICINE | Admitting: STUDENT IN AN ORGANIZED HEALTH CARE EDUCATION/TRAINING PROGRAM

## 2025-04-29 DIAGNOSIS — R20.0 LEFT SIDED NUMBNESS: Primary | ICD-10-CM

## 2025-04-29 LAB — GLUCOSE BLDC GLUCOMTR-MCNC: 118 MG/DL (ref 70–105)

## 2025-04-29 PROCEDURE — 99285 EMERGENCY DEPT VISIT HI MDM: CPT

## 2025-04-29 PROCEDURE — 82948 REAGENT STRIP/BLOOD GLUCOSE: CPT

## 2025-04-29 NOTE — Clinical Note
Level of Care: Telemetry [5]   Admitting Physician: NASREEN SAMUELS [[id number]]   Attending Physician: NASREEN SAMUELS [[id number]]   Bed Request Comments: CONSTANCE

## 2025-04-30 ENCOUNTER — APPOINTMENT (OUTPATIENT)
Dept: CT IMAGING | Facility: HOSPITAL | Age: 60
End: 2025-04-30

## 2025-04-30 ENCOUNTER — APPOINTMENT (OUTPATIENT)
Dept: MRI IMAGING | Facility: HOSPITAL | Age: 60
End: 2025-04-30

## 2025-04-30 ENCOUNTER — APPOINTMENT (OUTPATIENT)
Dept: CARDIOLOGY | Facility: HOSPITAL | Age: 60
End: 2025-04-30

## 2025-04-30 ENCOUNTER — APPOINTMENT (OUTPATIENT)
Dept: GENERAL RADIOLOGY | Facility: HOSPITAL | Age: 60
End: 2025-04-30

## 2025-04-30 VITALS
HEIGHT: 72 IN | HEART RATE: 67 BPM | OXYGEN SATURATION: 94 % | SYSTOLIC BLOOD PRESSURE: 130 MMHG | DIASTOLIC BLOOD PRESSURE: 77 MMHG | TEMPERATURE: 97.9 F | WEIGHT: 282 LBS | BODY MASS INDEX: 38.19 KG/M2 | RESPIRATION RATE: 15 BRPM

## 2025-04-30 PROBLEM — R20.0 LEFT SIDED NUMBNESS: Status: ACTIVE | Noted: 2025-04-30

## 2025-04-30 LAB
ABO GROUP BLD: NORMAL
ALBUMIN SERPL-MCNC: 4.7 G/DL (ref 3.5–5.2)
ALBUMIN/GLOB SERPL: 2 G/DL
ALP SERPL-CCNC: 125 U/L (ref 39–117)
ALT SERPL W P-5'-P-CCNC: 38 U/L (ref 1–41)
ANION GAP SERPL CALCULATED.3IONS-SCNC: 9 MMOL/L (ref 5–15)
AORTIC DIMENSIONLESS INDEX: 0.73 (DI)
APTT PPP: 27.9 SECONDS (ref 22.7–35.4)
AST SERPL-CCNC: 37 U/L (ref 1–40)
AV MEAN PRESS GRAD SYS DOP V1V2: 3 MMHG
AV VMAX SYS DOP: 137 CM/SEC
BASOPHILS # BLD AUTO: 0.02 10*3/MM3 (ref 0–0.2)
BASOPHILS NFR BLD AUTO: 0.2 % (ref 0–1.5)
BH CV ECHO MEAS - ACS: 2.1 CM
BH CV ECHO MEAS - AO MAX PG: 7.5 MMHG
BH CV ECHO MEAS - AO V2 VTI: 26.5 CM
BH CV ECHO MEAS - AVA(I,D): 2.5 CM2
BH CV ECHO MEAS - EDV(CUBED): 132.7 ML
BH CV ECHO MEAS - EDV(MOD-SP4): 96 ML
BH CV ECHO MEAS - EF(MOD-SP4): 59.3 %
BH CV ECHO MEAS - ESV(CUBED): 35.9 ML
BH CV ECHO MEAS - ESV(MOD-SP4): 39.1 ML
BH CV ECHO MEAS - FS: 35.3 %
BH CV ECHO MEAS - IVS/LVPW: 1 CM
BH CV ECHO MEAS - IVSD: 1.1 CM
BH CV ECHO MEAS - LA DIMENSION: 3.7 CM
BH CV ECHO MEAS - LAT PEAK E' VEL: 8.6 CM/SEC
BH CV ECHO MEAS - LV MASS(C)D: 213.9 GRAMS
BH CV ECHO MEAS - LV MAX PG: 3.1 MMHG
BH CV ECHO MEAS - LV MEAN PG: 2 MMHG
BH CV ECHO MEAS - LV V1 MAX: 88.6 CM/SEC
BH CV ECHO MEAS - LV V1 VTI: 19.4 CM
BH CV ECHO MEAS - LVIDD: 5.1 CM
BH CV ECHO MEAS - LVIDS: 3.3 CM
BH CV ECHO MEAS - LVOT AREA: 3.5 CM2
BH CV ECHO MEAS - LVOT DIAM: 2.1 CM
BH CV ECHO MEAS - LVPWD: 1.1 CM
BH CV ECHO MEAS - MED PEAK E' VEL: 8.1 CM/SEC
BH CV ECHO MEAS - MV A MAX VEL: 82.8 CM/SEC
BH CV ECHO MEAS - MV DEC SLOPE: 250.5 CM/SEC2
BH CV ECHO MEAS - MV DEC TIME: 0.28 SEC
BH CV ECHO MEAS - MV E MAX VEL: 81.8 CM/SEC
BH CV ECHO MEAS - MV E/A: 0.99
BH CV ECHO MEAS - MV MAX PG: 2.2 MMHG
BH CV ECHO MEAS - MV MEAN PG: 1 MMHG
BH CV ECHO MEAS - MV P1/2T: 94.5 MSEC
BH CV ECHO MEAS - MV V2 VTI: 31.2 CM
BH CV ECHO MEAS - MVA(P1/2T): 2.33 CM2
BH CV ECHO MEAS - MVA(VTI): 2.15 CM2
BH CV ECHO MEAS - PA V2 MAX: 131 CM/SEC
BH CV ECHO MEAS - PULM A REVS DUR: 0.15 SEC
BH CV ECHO MEAS - PULM A REVS VEL: 28.1 CM/SEC
BH CV ECHO MEAS - PULM DIAS VEL: 45.3 CM/SEC
BH CV ECHO MEAS - PULM S/D: 1.23
BH CV ECHO MEAS - PULM SYS VEL: 55.9 CM/SEC
BH CV ECHO MEAS - QP/QS: 0.59
BH CV ECHO MEAS - RV MAX PG: 2.07 MMHG
BH CV ECHO MEAS - RV V1 MAX: 71.9 CM/SEC
BH CV ECHO MEAS - RV V1 VTI: 17.6 CM
BH CV ECHO MEAS - RVDD: 3.7 CM
BH CV ECHO MEAS - RVOT DIAM: 1.7 CM
BH CV ECHO MEAS - SV(LVOT): 67.2 ML
BH CV ECHO MEAS - SV(MOD-SP4): 56.9 ML
BH CV ECHO MEAS - SV(RVOT): 39.9 ML
BH CV ECHO MEAS - TAPSE (>1.6): 3 CM
BH CV ECHO MEASUREMENTS AVERAGE E/E' RATIO: 9.8
BH CV XLRA - TDI S': 11.7 CM/SEC
BILIRUB SERPL-MCNC: 0.4 MG/DL (ref 0–1.2)
BLD GP AB SCN SERPL QL: NEGATIVE
BUN SERPL-MCNC: 14 MG/DL (ref 6–20)
BUN/CREAT SERPL: 13 (ref 7–25)
CALCIUM SPEC-SCNC: 9.4 MG/DL (ref 8.6–10.5)
CHLORIDE SERPL-SCNC: 104 MMOL/L (ref 98–107)
CHOLEST SERPL-MCNC: 182 MG/DL (ref 0–200)
CO2 SERPL-SCNC: 27 MMOL/L (ref 22–29)
CREAT SERPL-MCNC: 1.08 MG/DL (ref 0.76–1.27)
DEPRECATED RDW RBC AUTO: 43.5 FL (ref 37–54)
EGFRCR SERPLBLD CKD-EPI 2021: 79.5 ML/MIN/1.73
EOSINOPHIL # BLD AUTO: 0.13 10*3/MM3 (ref 0–0.4)
EOSINOPHIL NFR BLD AUTO: 1.5 % (ref 0.3–6.2)
ERYTHROCYTE [DISTWIDTH] IN BLOOD BY AUTOMATED COUNT: 13.3 % (ref 12.3–15.4)
GLOBULIN UR ELPH-MCNC: 2.4 GM/DL
GLUCOSE SERPL-MCNC: 111 MG/DL (ref 65–99)
HBA1C MFR BLD: 5.55 % (ref 4.8–5.6)
HCT VFR BLD AUTO: 53.8 % (ref 37.5–51)
HDLC SERPL-MCNC: 25 MG/DL (ref 40–60)
HGB BLD-MCNC: 18.1 G/DL (ref 13–17.7)
HOLD SPECIMEN: NORMAL
HOLD SPECIMEN: NORMAL
IMM GRANULOCYTES # BLD AUTO: 0.03 10*3/MM3 (ref 0–0.05)
IMM GRANULOCYTES NFR BLD AUTO: 0.3 % (ref 0–0.5)
INR PPP: 0.98 (ref 0.9–1.1)
LDLC SERPL CALC-MCNC: 107 MG/DL (ref 0–100)
LDLC/HDLC SERPL: 3.95 {RATIO}
LYMPHOCYTES # BLD AUTO: 2.12 10*3/MM3 (ref 0.7–3.1)
LYMPHOCYTES NFR BLD AUTO: 24.5 % (ref 19.6–45.3)
MCH RBC QN AUTO: 29.7 PG (ref 26.6–33)
MCHC RBC AUTO-ENTMCNC: 33.6 G/DL (ref 31.5–35.7)
MCV RBC AUTO: 88.3 FL (ref 79–97)
MONOCYTES # BLD AUTO: 0.59 10*3/MM3 (ref 0.1–0.9)
MONOCYTES NFR BLD AUTO: 6.8 % (ref 5–12)
NEUTROPHILS NFR BLD AUTO: 5.76 10*3/MM3 (ref 1.7–7)
NEUTROPHILS NFR BLD AUTO: 66.7 % (ref 42.7–76)
NRBC BLD AUTO-RTO: 0 /100 WBC (ref 0–0.2)
PLATELET # BLD AUTO: 184 10*3/MM3 (ref 140–450)
PMV BLD AUTO: 10.9 FL (ref 6–12)
POTASSIUM SERPL-SCNC: 4.2 MMOL/L (ref 3.5–5.2)
PROT SERPL-MCNC: 7.1 G/DL (ref 6–8.5)
PROTHROMBIN TIME: 12.9 SECONDS (ref 11.7–14.2)
QT INTERVAL: 378 MS
QTC INTERVAL: 416 MS
RBC # BLD AUTO: 6.09 10*6/MM3 (ref 4.14–5.8)
RH BLD: POSITIVE
SINUS: 3.7 CM
SODIUM SERPL-SCNC: 140 MMOL/L (ref 136–145)
STJ: 2.5 CM
T&S EXPIRATION DATE: NORMAL
TRIGL SERPL-MCNC: 291 MG/DL (ref 0–150)
TSH SERPL DL<=0.05 MIU/L-ACNC: 3.1 UIU/ML (ref 0.27–4.2)
VIT B12 BLD-MCNC: 455 PG/ML (ref 211–946)
VLDLC SERPL-MCNC: 50 MG/DL (ref 5–40)
WBC NRBC COR # BLD AUTO: 8.65 10*3/MM3 (ref 3.4–10.8)
WHOLE BLOOD HOLD COAG: NORMAL
WHOLE BLOOD HOLD SPECIMEN: NORMAL

## 2025-04-30 PROCEDURE — 86900 BLOOD TYPING SEROLOGIC ABO: CPT

## 2025-04-30 PROCEDURE — 86901 BLOOD TYPING SEROLOGIC RH(D): CPT | Performed by: EMERGENCY MEDICINE

## 2025-04-30 PROCEDURE — 70498 CT ANGIOGRAPHY NECK: CPT

## 2025-04-30 PROCEDURE — 84443 ASSAY THYROID STIM HORMONE: CPT | Performed by: STUDENT IN AN ORGANIZED HEALTH CARE EDUCATION/TRAINING PROGRAM

## 2025-04-30 PROCEDURE — G0378 HOSPITAL OBSERVATION PER HR: HCPCS

## 2025-04-30 PROCEDURE — 93306 TTE W/DOPPLER COMPLETE: CPT

## 2025-04-30 PROCEDURE — 0042T HC CT CEREBRAL PERFUSION W/WO CONTRAST: CPT

## 2025-04-30 PROCEDURE — 93306 TTE W/DOPPLER COMPLETE: CPT | Performed by: INTERNAL MEDICINE

## 2025-04-30 PROCEDURE — 82607 VITAMIN B-12: CPT | Performed by: NURSE PRACTITIONER

## 2025-04-30 PROCEDURE — 85025 COMPLETE CBC W/AUTO DIFF WBC: CPT | Performed by: EMERGENCY MEDICINE

## 2025-04-30 PROCEDURE — 86901 BLOOD TYPING SEROLOGIC RH(D): CPT

## 2025-04-30 PROCEDURE — 86900 BLOOD TYPING SEROLOGIC ABO: CPT | Performed by: EMERGENCY MEDICINE

## 2025-04-30 PROCEDURE — 25510000001 IOPAMIDOL PER 1 ML: Performed by: EMERGENCY MEDICINE

## 2025-04-30 PROCEDURE — 85610 PROTHROMBIN TIME: CPT | Performed by: EMERGENCY MEDICINE

## 2025-04-30 PROCEDURE — 70496 CT ANGIOGRAPHY HEAD: CPT

## 2025-04-30 PROCEDURE — 99255 IP/OBS CONSLTJ NEW/EST HI 80: CPT | Performed by: NURSE PRACTITIONER

## 2025-04-30 PROCEDURE — 71045 X-RAY EXAM CHEST 1 VIEW: CPT

## 2025-04-30 PROCEDURE — 93005 ELECTROCARDIOGRAM TRACING: CPT | Performed by: EMERGENCY MEDICINE

## 2025-04-30 PROCEDURE — 83036 HEMOGLOBIN GLYCOSYLATED A1C: CPT | Performed by: STUDENT IN AN ORGANIZED HEALTH CARE EDUCATION/TRAINING PROGRAM

## 2025-04-30 PROCEDURE — 80061 LIPID PANEL: CPT | Performed by: STUDENT IN AN ORGANIZED HEALTH CARE EDUCATION/TRAINING PROGRAM

## 2025-04-30 PROCEDURE — 70551 MRI BRAIN STEM W/O DYE: CPT

## 2025-04-30 PROCEDURE — 86850 RBC ANTIBODY SCREEN: CPT | Performed by: EMERGENCY MEDICINE

## 2025-04-30 PROCEDURE — 80053 COMPREHEN METABOLIC PANEL: CPT | Performed by: EMERGENCY MEDICINE

## 2025-04-30 PROCEDURE — 70450 CT HEAD/BRAIN W/O DYE: CPT

## 2025-04-30 PROCEDURE — 85730 THROMBOPLASTIN TIME PARTIAL: CPT | Performed by: EMERGENCY MEDICINE

## 2025-04-30 RX ORDER — IOPAMIDOL 755 MG/ML
100 INJECTION, SOLUTION INTRAVASCULAR
Status: COMPLETED | OUTPATIENT
Start: 2025-04-30 | End: 2025-04-30

## 2025-04-30 RX ORDER — SODIUM CHLORIDE 0.9 % (FLUSH) 0.9 %
10 SYRINGE (ML) INJECTION EVERY 12 HOURS SCHEDULED
Status: DISCONTINUED | OUTPATIENT
Start: 2025-04-30 | End: 2025-04-30 | Stop reason: HOSPADM

## 2025-04-30 RX ORDER — BISACODYL 5 MG/1
5 TABLET, DELAYED RELEASE ORAL DAILY PRN
Status: DISCONTINUED | OUTPATIENT
Start: 2025-04-30 | End: 2025-04-30 | Stop reason: HOSPADM

## 2025-04-30 RX ORDER — CLOPIDOGREL BISULFATE 75 MG/1
75 TABLET ORAL ONCE
Status: COMPLETED | OUTPATIENT
Start: 2025-04-30 | End: 2025-04-30

## 2025-04-30 RX ORDER — SODIUM CHLORIDE 9 MG/ML
40 INJECTION, SOLUTION INTRAVENOUS AS NEEDED
Status: DISCONTINUED | OUTPATIENT
Start: 2025-04-30 | End: 2025-04-30 | Stop reason: HOSPADM

## 2025-04-30 RX ORDER — NICOTINE 21 MG/24HR
1 PATCH, TRANSDERMAL 24 HOURS TRANSDERMAL
Status: DISCONTINUED | OUTPATIENT
Start: 2025-04-30 | End: 2025-04-30 | Stop reason: HOSPADM

## 2025-04-30 RX ORDER — SODIUM CHLORIDE 0.9 % (FLUSH) 0.9 %
10 SYRINGE (ML) INJECTION AS NEEDED
Status: DISCONTINUED | OUTPATIENT
Start: 2025-04-30 | End: 2025-04-30 | Stop reason: HOSPADM

## 2025-04-30 RX ORDER — BISACODYL 10 MG
10 SUPPOSITORY, RECTAL RECTAL DAILY PRN
Status: DISCONTINUED | OUTPATIENT
Start: 2025-04-30 | End: 2025-04-30 | Stop reason: HOSPADM

## 2025-04-30 RX ORDER — AMOXICILLIN 250 MG
2 CAPSULE ORAL 2 TIMES DAILY PRN
Status: DISCONTINUED | OUTPATIENT
Start: 2025-04-30 | End: 2025-04-30 | Stop reason: HOSPADM

## 2025-04-30 RX ORDER — NITROGLYCERIN 0.4 MG/1
0.4 TABLET SUBLINGUAL
Status: DISCONTINUED | OUTPATIENT
Start: 2025-04-30 | End: 2025-04-30 | Stop reason: HOSPADM

## 2025-04-30 RX ORDER — ATORVASTATIN CALCIUM 40 MG/1
40 TABLET, FILM COATED ORAL NIGHTLY
Qty: 90 TABLET | Refills: 1 | Status: SHIPPED | OUTPATIENT
Start: 2025-04-30

## 2025-04-30 RX ORDER — ATORVASTATIN CALCIUM 40 MG/1
40 TABLET, FILM COATED ORAL NIGHTLY
Status: DISCONTINUED | OUTPATIENT
Start: 2025-04-30 | End: 2025-04-30 | Stop reason: HOSPADM

## 2025-04-30 RX ORDER — AMLODIPINE BESYLATE 5 MG/1
5 TABLET ORAL
Qty: 90 TABLET | Refills: 1 | Status: SHIPPED | OUTPATIENT
Start: 2025-04-30

## 2025-04-30 RX ORDER — ASPIRIN 325 MG
325 TABLET ORAL ONCE
Status: COMPLETED | OUTPATIENT
Start: 2025-04-30 | End: 2025-04-30

## 2025-04-30 RX ORDER — POLYETHYLENE GLYCOL 3350 17 G/17G
17 POWDER, FOR SOLUTION ORAL DAILY PRN
Status: DISCONTINUED | OUTPATIENT
Start: 2025-04-30 | End: 2025-04-30 | Stop reason: HOSPADM

## 2025-04-30 RX ADMIN — IOPAMIDOL 100 ML: 755 INJECTION, SOLUTION INTRAVENOUS at 00:21

## 2025-04-30 RX ADMIN — ASPIRIN 325 MG ORAL TABLET 325 MG: 325 PILL ORAL at 00:33

## 2025-04-30 RX ADMIN — Medication 10 ML: at 03:08

## 2025-04-30 RX ADMIN — CLOPIDOGREL BISULFATE 75 MG: 75 TABLET, FILM COATED ORAL at 00:32

## 2025-04-30 RX ADMIN — Medication 10 ML: at 08:35

## 2025-04-30 NOTE — TELEPHONE ENCOUNTER
"Patient called reporting developing numbness and weakness on left side about 19:30 today. Denies history of stroke, blood clot, or being on blood thinners. Advised Patient will need to go to ER, will need to call EMS. Patient states he does not have insurance. Wants to try to call for [facility] first. Advised if ride can not come quickly will need to call EMS.           Reason for Disposition   [1] Numbness (i.e., loss of sensation) of the face, arm / hand, or leg / foot on one side of the body AND [2] sudden onset AND [3] present now    Additional Information   Negative: [1] SEVERE weakness (i.e., unable to walk or barely able to walk, requires support) AND [2] new-onset or worsening   Negative: [1] Weakness (i.e., paralysis, loss of muscle strength) of the face, arm / hand, or leg / foot on one side of the body AND [2] sudden onset AND [3] present now  (Exception: Bell's palsy suspected [i.e., weakness only on one side of the face, developing over hours to days, no other symptoms].)    Answer Assessment - Initial Assessment Questions  1. SYMPTOM: \"What is the main symptom you are concerned about?\" (e.g., weakness, numbness)      Weakness, numbness on left - arm & leg  2. ONSET: \"When did this start?\" (minutes, hours, days; while sleeping)     19:30  3. LAST NORMAL: \"When was the last time you (the patient) were normal (no symptoms)?\"      19:30  4. PATTERN \"Does this come and go, or has it been constant since it started?\"  \"Is it present now?\"      constant  5. CARDIAC SYMPTOMS: \"Have you had any of the following symptoms: chest pain, difficulty breathing, palpitations?\"      denies  6. NEUROLOGIC SYMPTOMS: \"Have you had any of the following symptoms: headache, dizziness, vision loss, double vision, changes in speech, unsteady on your feet?\"      Unsteady gait   7. OTHER SYMPTOMS: \"Do you have any other symptoms?\"      no  8. PREGNANCY: \"Is there any chance you are pregnant?\" \"When was your last menstrual period?\"      " N/a    Protocols used: Neurologic Deficit-ADULT-AH

## 2025-04-30 NOTE — CASE MANAGEMENT/SOCIAL WORK
Social Work Assessment   Mo     Patient Name: Kendell Mccall  MRN: [id number]  Today's Date: 4/30/2025    Admit Date: 4/29/2025     Discharge Needs Assessment       Row Name 04/30/25 1404       Living Environment    People in Home child(jamilah), adult;grandchild(jamilah)    Name(s) of People in Home Zenaida Youssef    Current Living Arrangements home    Potentially Unsafe Housing Conditions none    In the past 12 months has the electric, gas, oil, or water company threatened to shut off services in your home? No    Primary Care Provided by self    Provides Primary Care For no one    Family Caregiver if Needed child(jamilah), adult    Family Caregiver Names Zenaida Youssef    Quality of Family Relationships supportive    Living Arrangement Comments Pt resides with daughter.       Resource/Environmental Concerns    Resource/Environmental Concerns none    Transportation Concerns no car       Transportation Needs    In the past 12 months, has lack of transportation kept you from medical appointments or from getting medications? no    In the past 12 months, has lack of transportation kept you from meetings, work, or from getting things needed for daily living? No       Food Insecurity    Within the past 12 months, you worried that your food would run out before you got the money to buy more. Never true    Within the past 12 months, the food you bought just didn't last and you didn't have money to get more. Never true       Transition Planning    Patient/Family Anticipates Transition to home with family    Patient/Family Anticipated Services at Transition     Transportation Anticipated other (see comments)  Pt will need transportation arranged       Discharge Needs Assessment    Readmission Within the Last 30 Days no previous admission in last 30 days    Equipment Currently Used at Home bp cuff    Concerns to be Addressed financial/insurance    Do you want help finding or keeping work or a job? I do not need or  want help    Do you want help with school or training? For example, starting or completing job training or getting a high school diploma, GED or equivalent No    Anticipated Changes Related to Illness none    Equipment Needed After Discharge none    Current Discharge Risk financial support inadequate              Discharge Plan       Row Name 04/30/25 1407       Plan    Plan Anticipate Pt will return home with family.    Plan Comments Confirmed PCP (Len) but Pt reports he has not seen PCP in over a year due to no insurance. Pt agreeable to F/U with PCP from [facility] should JUAN JOSE screen be denied. Confirmed pharmacy. Pt agreeable to M2B. LSW gave Pt $4 and $10 [facility] medication list. Pt confirmed transportation barriers. LSW gave Pt transportation resource sheet. Pt denies financial hardship with food and utilities. DME: B/P cuff. Pt is IADL’s. Anticipate Pt will return home with daughter, Zenaida and granddaughter. Pt reports he took an Uber to ER and will need transportation arranged at D/C.    Final Note LSW consulted re: Pt listed as private pay. LSW requested [facility] to screen Pt for JUAN JOSE. According to chart notes from [facility], Pt declined JUAN JOSE screen. LSW met w/Pt at bedside in room 245. LSW introduced self and role. Pt reports he does not remember declining JUAN JOSE screen. Pt reports he is unemployed and has no income. Pt reports he has not had insurance for approx. 1 ½ years, stating not having insurance is why he has not seen his PCP in more than a year. Pt reports he has never applied for JUAN JOSE. Pt also reports he has not had any prescriptions filled in this time. LSW discussed being screened for JUAN JOSE while at hospital versus following D/C at a local [facility] office. Pt agreeable to JUAN JOSE screen by [facility]. LSW emailed to request another screen and CC’d RNCM supervisor. LSW to follow.              Demographic Summary       Row Name 04/30/25 1401       General Information    Admission Type observation     Arrived From emergency department    Referral Source admission list    Reason for Consult care coordination/care conference;community resources;discharge planning;insurance concerns    Preferred Language English       Contact Information    Permission Granted to Share Info With               Functional Status       Row Name 04/30/25 1403       Functional Status    Usual Activity Tolerance excellent    Current Activity Tolerance good       Physical Activity    On average, how many days per week do you engage in moderate to strenuous exercise (like a brisk walk)? 0 days    On average, how many minutes do you engage in exercise at this level? 0 min    Number of minutes of exercise per week 0       Functional Status, IADL    Medications independent    Meal Preparation independent    Housekeeping independent    Laundry independent    Shopping independent    If for any reason you need help with day-to-day activities such as bathing, preparing meals, shopping, managing finances, etc., do you get the help you need? I get all the help I need       Mental Status    General Appearance WDL WDL    General Appearance well-kept, clean       Mental Status Summary    Recent Changes in Mental Status/Cognitive Functioning no changes       Employment/    Employment Status unemployed    Current or Previous Occupation not applicable           Bel Aldana, JEANNE  Medical Social Worker

## 2025-04-30 NOTE — CONSULTS
Primary Care Provider: Provider, No Known     Consult requested by: Dr. Kim    Reason for Consultation: Neurological evaluation, ED stroke alert    History taken from: patient chart RN    Chief complaint: Left-sided numbness, dizziness       SUBJECTIVE:    History of present illness: Background per H&P:Kendell Mccall is a 59 y.o. male with a CMH of lifelong smoker, hypertension, hyperlipidemia, nonmedication compliance, who presented to [facility] with  left-sided numbness.      Patient is AAOx3. Reports developing left-sided upper and lower extremity numbness while working in the garden. Denies motor weakness or falls. Also reports dizziness since symptom onset, with a sensation of veering to the left, but no actual falls. Denies chest pain, shortness of breath, fever, or chills. Not on home oxygen, though endorses daily tobacco use. Denies taking any current medications despite a known diagnosis of hypertension.     In the ED, patient vital stable, afebrile, CMP and CBC mostly unremarkable, glucose 111, WBC normal, hemoglobin 18.1, chest x-ray negative for any acute disease.  CTA head and neck showing no evidence of LVO, stenosis or aneurysm, right vertebral is not seen well beyond the V3 segment and may terminate in the PICA, CT brain perfusion showing no significant perfusion regarding the brain, was given aspirin 325 and Plavix 75 once, MRI is pending.  His Case was discussed with teleneuro on-call per ED.  Admitted to medicine team further inpatient management.      - Portions of the above HPI were copied from previous encounters and edited as appropriate. PMH as detailed below.     Patient last known well around 7:30 PM 4/29-patient was evaluated in the emergency room around midnight therefore not a TNK candidate regardless.  Dr. Kim did discuss patient with the teleneurology team which did give him aspirin and Plavix and ordered stroke workup.    Review of Systems    Constitutional: Negative.    HENT: Negative.     Eyes:  Negative for visual disturbance.   Respiratory: Negative.     Cardiovascular: Negative.    Gastrointestinal:  Negative for nausea and vomiting.   Endocrine: Negative.    Genitourinary: Negative.    Musculoskeletal:  Positive for gait problem.   Skin: Negative.    Allergic/Immunologic: Negative.    Neurological:  Positive for dizziness and numbness (Tingling). Negative for tremors, seizures, syncope, facial asymmetry, speech difficulty, weakness, light-headedness and headaches.   Hematological: Negative.    Psychiatric/Behavioral:  Negative for confusion.            PATIENT HISTORY:  Past Medical History:   Diagnosis Date    ADHD (attention deficit hyperactivity disorder)     Hypertension    ,   Past Surgical History:   Procedure Laterality Date    EYE SURGERY      HERNIA REPAIR      SHOULDER ROTATOR CUFF REPAIR Right 02/04/2022   ,   Family History   Problem Relation Age of Onset    Heart disease Mother     Diabetes Father     Lung cancer Father 72        Associated to cigarette smoking   ,   Social History     Tobacco Use    Smoking status: Every Day     Current packs/day: 1.00     Average packs/day: 1 pack/day for 43.3 years (43.3 ttl pk-yrs)     Types: Cigarettes, Cigars     Start date: 1982     Passive exposure: Current    Smokeless tobacco: Never   Vaping Use    Vaping status: Never Used   Substance Use Topics    Alcohol use: Yes     Comment: rarely    Drug use: Yes     Types: Marijuana   ,   Prior to Admission medications    Medication Sig Start Date End Date Taking? Authorizing Provider   amLODIPine (NORVASC) 5 MG tablet TAKE 1 TABLET BY MOUTH EVERYDAY AT BEDTIME 5/29/24   Mouna Harrington APRN   amphetamine-dextroamphetamine (Adderall) 12.5 MG tablet Take 1 tablet by mouth 2 (Two) Times a Day. 5/7/24   Mouna Harrington APRN   aspirin 81 MG EC tablet Take 1 tablet by mouth Daily.  Patient not taking: Reported on 4/30/2025    Provider, MD Alice    ibuprofen (ADVIL,MOTRIN) 200 MG tablet Take 1 tablet by mouth Daily.    Provider, MD Alice   sildenafil (Viagra) 100 MG tablet Take 1 tablet by mouth Daily As Needed for Erectile Dysfunction. 3/22/21   Mouna Harrington APRN   triamterene-hydrochlorothiazide (DYAZIDE) 37.5-25 MG per capsule Take 1 capsule by mouth Daily. 5/7/24   Mouna Harrington APRN    Allergies:  Patient has no known allergies.    Current Facility-Administered Medications   Medication Dose Route Frequency Provider Last Rate Last Admin    atorvastatin (LIPITOR) tablet 40 mg  40 mg Oral Nightly Zaid Cornejo MD        sennosides-docusate (PERICOLACE) 8.6-50 MG per tablet 2 tablet  2 tablet Oral BID PRN Zaid Cornejo MD        And    polyethylene glycol (MIRALAX) packet 17 g  17 g Oral Daily PRN Zaid Cornejo MD        And    bisacodyl (DULCOLAX) EC tablet 5 mg  5 mg Oral Daily PRN Zaid Cornejo MD        And    bisacodyl (DULCOLAX) suppository 10 mg  10 mg Rectal Daily PRN Zaid Cornejo MD        Calcium Replacement - Follow Nurse / BPA Driven Protocol   Not Applicable Zaid Mendoza MD        Magnesium Standard Dose Replacement - Follow Nurse / BPA Driven Protocol   Not Applicable Zaid Mendoza MD        nicotine (NICODERM CQ) 14 MG/24HR patch 1 patch  1 patch Transdermal Q24H Zaid Cornejo MD        nitroglycerin (NITROSTAT) SL tablet 0.4 mg  0.4 mg Sublingual Q5 Min PRZaid Sinclair MD        Phosphorus Replacement - Follow Nurse / BPA Driven Protocol   Not Applicable PRZaid Sinclair MD        Potassium Replacement - Follow Nurse / BPA Driven Protocol   Not Applicable Zaid Mendoza MD        sodium chloride 0.9 % flush 10 mL  10 mL Intravenous PRN Toby Kim MD        sodium chloride 0.9 % flush 10 mL  10 mL Intravenous Q12H Zaid Cornejo MD   10 mL at 04/30/25 0835    sodium chloride 0.9 % flush 10 mL  10 mL  Intravenous Zaid Mendoza MD        sodium chloride 0.9 % infusion 40 mL  40 mL Intravenous Zaid Mendoza MD            ________________________________________________________        OBJECTIVE:    PHYSICAL EXAM:    Constitutional: The patient is in no apparent distress, bright awake and alert. There is no shortness of breath.     PSYCHIATRIC: Mood/affect normal, judgement normal, appropriate    HEENT: TNormocephalic, atraumatic.     Chest: Breathing unlabored    Cardiac: Regular rate and rhythm.     Extremities:  No clubbing, cyanosis or edema.    NEUROLOGICAL:    Cognition:   Fully oriented.  Fund of knowledge excellent.  Concentration and attention normal.   Language normal with normal comprehension, fluent speech, intact repetition and naming.   Short and long term memory appears intact    Cranial nerves;    II - pupils bilaterally equal reacting to light,  No new Visual field deficits;  Fundoscopic exam- Not able to be done, non-dilated exam  III,IV,VI: EOMI with no diplopia  V: Normal facial sensations  VII: No facial asymmetry,  VIII: No New hearing abnormality  IX, X, XI: normal gag and shoulder shrug;  XII: tongue is in the midline.    Sensory:  Intact to light touch in all extremities.     Motor: Strength 5/5 bilaterally upper and lower extremities. No involuntary movements present. Normal tone and bulk.  Deep tendon reflexes: 2/4 and symmetrical in biceps, brachioradialis, triceps, bilateral 2/4 knees and ankles. Both plantars are flexor.    Cerebellar: Finger to nose and mirror movements normal bilaterally.    Gait and balance: Deferred.     Physical exam performed by YULIA Zimmerman.    NIHSS:    Level Of Consciousness:  0  LOC Questions to Month and age: 0  LOC Commands:  0  Best Gaze: 0  Visual: 0  Facial Palsy: 0  Motor: Left Arm-0  Left leg-0; Right Arm-0 Right Leg-0  Limb Ataxia: 0  Sensory: 1  Best Language: 0  Dysarthria: 0  Extinction/Neglect: 0    Total:  1  ________________________________________________________   RESULTS REVIEW:    VITAL SIGNS:   Temp:  [97.9 °F (36.6 °C)-98.5 °F (36.9 °C)] 97.9 °F (36.6 °C)  Heart Rate:  [] 67  Resp:  [15-16] 15  BP: (130-154)/() 130/77     LABS:      Lab 04/30/25  0011   WBC 8.65   HEMOGLOBIN 18.1*   HEMATOCRIT 53.8*   PLATELETS 184   NEUTROS ABS 5.76   IMMATURE GRANS (ABS) 0.03   LYMPHS ABS 2.12   MONOS ABS 0.59   EOS ABS 0.13   MCV 88.3   PROTIME 12.9   APTT 27.9         Lab 04/30/25  0011   SODIUM 140   POTASSIUM 4.2   CHLORIDE 104   CO2 27.0   ANION GAP 9.0   BUN 14   CREATININE 1.08   EGFR 79.5   GLUCOSE 111*   CALCIUM 9.4   HEMOGLOBIN A1C 5.55   TSH 3.100         Lab 04/30/25  0011   TOTAL PROTEIN 7.1   ALBUMIN 4.7   GLOBULIN 2.4   ALT (SGPT) 38   AST (SGOT) 37   BILIRUBIN 0.4   ALK PHOS 125*         Lab 04/30/25  0011   PROTIME 12.9   INR 0.98         Lab 04/30/25  0303   CHOLESTEROL 182   LDL CHOL 107*   HDL CHOL 25*   TRIGLYCERIDES 291*         Lab 04/30/25  0011   ABO TYPING B   RH TYPING Positive   ANTIBODY SCREEN Negative             Lab Results   Component Value Date    TSH 3.100 04/30/2025     (H) 04/30/2025    HGBA1C 5.55 04/30/2025       IMAGING STUDIES:  MRI Brain Without Contrast  Result Date: 4/30/2025  Impression: 1.No acute intracranial abnormality. 2.Old right medial orbital wall fracture. Electronically Signed: Kishor Bess MD  4/30/2025 2:03 AM EDT  Workstation ID: [id number]    CT Angiogram Head w AI Analysis of LVO  Result Date: 4/30/2025  Impression: 1.No evidence of large vessel occlusion, significant stenosis or aneurysm. 2.Dominant left vertebral artery with diminutive right vertebral artery. The right vertebral artery is not well seen beyond the V3 segment and may terminate in PICA. Electronically Signed: Kishor Bess MD  4/30/2025 12:59 AM EDT  Workstation ID: [id number]    CT Angiogram Neck  Result Date: 4/30/2025  Impression: 1.No evidence of large vessel occlusion,  significant stenosis or aneurysm. 2.Dominant left vertebral artery with diminutive right vertebral artery. The right vertebral artery is not well seen beyond the V3 segment and may terminate in PICA. Electronically Signed: Kishor Bess MD  4/30/2025 12:59 AM EDT  Workstation ID: [id number]    XR Chest 1 View  Result Date: 4/30/2025  No active disease. Electronically Signed: Cole Cornejo MD  4/30/2025 12:55 AM EDT  Workstation ID: [id number]    CT CEREBRAL PERFUSION WITH & WITHOUT CONTRAST  Result Date: 4/30/2025  Impression: No significant perfusion abnormality in the brain. Electronically Signed: Kishor Bess MD  4/30/2025 12:46 AM EDT  Workstation ID: [id number]    CT Head Without Contrast Stroke Protocol  Result Date: 4/30/2025  Impression: 1.No acute intracranial abnormality. 2.Right orbit prosthesis. Electronically Signed: Kishor Bess MD  4/30/2025 12:12 AM EDT  Workstation ID: [id number]      I reviewed the patient's new clinical results.    ________________________________________________________     PROBLEM LIST:    Left sided numbness            ASSESSMENT/PLAN:    Dizziness, off balance, left arm, leg and face tingling   Pt still having left sided sensory changes although improving   MRI brain negative. Will rule out cervical spine radiculopathy although that does not explain the facial numbness     CT head: Negative for hemorrhage  MRI brain: Personally reviewed, no acute or subacute stroke.  CTA head and neck: Reviewed there is a diminutive right vertebral artery that is not seen beyond V3 and terminates in PICA.  Basilar artery is patent.  Bilateral PCAs are patent.  Echo: Pending  EKG: Sinus rhythm, rate 73  Labs: A1C: 5.55, B12: P, LDL: 107 TSH: 3.100  Antithrombotics: Recommend ASA 81 mg daily   Statin: Lipitor 80   - PT/OT/ST as appropriate, Neuro checks per protocol, DVT prophylaxis, Stroke education  MRI cervical spine pending- will follow up     2. Hypertension  - Continue home medications  -  BP goal 130/90, avoid hypotension    3. Hyperlipidemia  - Statin & dietary modifications    4. Tobacco abuse  Discussed importance of smoking cessation and risk for cardiovascular disease and strokes    5. Modification of stroke risk factors:   - Blood pressure should be less than 130/80 outpatient, HbA1c less than 6.5, LDL less than 70; b12>500 and smoking cessation if applicable. We would be grateful if the primary team / primary care physician would keep a close watch on the above targets.  - Stroke education  - Follow up with neurologist of choice    There are no further recommendations. Will sign off, please call with any questions or concerns.    I discussed the patient's findings and my recommendations with patient and nursing staff    BRADEN Loredo  04/30/25  09:40 EDT

## 2025-04-30 NOTE — SIGNIFICANT NOTE
04/30/25 1346   Living Situation   Current Living Arrangements home   Potentially Unsafe Housing Conditions none   Food Insecurity   Within the past 12 months, you worried that your food would run out before you got the money to buy more. Never true   Within the past 12 months, the food you bought just didn't last and you didn't have money to get more. Never true   Transportation Needs   In the past 12 months, has lack of transportation kept you from medical appointments or from getting medications? no   In the past 12 months, has lack of transportation kept you from meetings, work, or from getting things needed for daily living? No   Utilities   In the past 12 months has the electric, gas, oil, or water company threatened to shut off services in your home? No   Abuse Screen (yes response referral indicated)   Feels Unsafe at Home or Work/School no   Feels Threatened by Someone no   Does Anyone Try to Keep You From Having Contact with Others or Doing Things Outside Your Home? no   Physical Signs of Abuse Present no   Financial Resource Strain   How hard is it for you to pay for the very basics like food, housing, medical care, and heating? Not very   Employment   Do you want help finding or keeping work or a job? I do not need or want help   Family and Community Support   If for any reason you need help with day-to-day activities such as bathing, preparing meals, shopping, managing finances, etc., do you get the help you need? I get all the help I need   How often do you feel lonely or isolated from those around you? Sometimes   Education   Preferred Language English   Do you want help with school or training? For example, starting or completing job training or getting a high school diploma, GED or equivalent No   Physical Activity   On average, how many days per week do you engage in moderate to strenuous exercise (like a brisk walk)? 0 days   On average, how many minutes do you engage in exercise at this level?  0 min   Number of minutes of exercise per week (!) 0   Alcohol Use   Q1: How often do you have a drink containing alcohol? Monthly or l   Q2: How many drinks containing alcohol do you have on a typical day when you are drinking? 1 or 2   Q3: How often do you have six or more drinks on one occasion? Less than mo   Stress   Do you feel stress - tense, restless, nervous, or anxious, or unable to sleep at night because your mind is troubled all the time - these days? To some exte   Mental Health   Little interest or pleasure in doing things Several days   Feeling down, depressed, or hopeless Over half   Disabilities   Difficulty Concentrating, Remembering or Making Decisions yes   Difficulty Managing Errands Independently no

## 2025-04-30 NOTE — CASE MANAGEMENT/SOCIAL WORK
Social Work Assessment   [place]     Patient Name: Kendell Mccall  MRN: [id number]  Today's Date: 4/30/2025    Admit Date: 4/29/2025     Demographic Summary       Row Name 04/30/25 0845       General Information    Admission Type observation    Arrived From emergency department    Referral Source admission list    Reason for Consult insurance concerns    General Information Comments Upon reviewing the chart, LSW noted Pt listed as self-pay. LSW emailed [facility] requesting JUAN JOSE screen. LSW to follow.       Contact Information    Permission Granted to Share Info With            Bel Aldana, W  Medical Social Worker

## 2025-04-30 NOTE — H&P
[facility] Medicine Services  History & Physical    Patient Name: Kendell Mccall  : 1965  MRN: [id number]  Primary Care Physician:  Provider, No Known  Date of admission: 2025  Date and Time of Service: 2025 at 400    Subjective      Chief Complaint: left-sided numbness.     History of Present Illness: Kendell Mccall is a 59 y.o. male with a CMH of lifelong smoker, hypertension, hyperlipidemia, nonmedication compliance, who presented to [facility] on 2025 with  left-sided numbness.     Patient is AAOx3. Reports developing left-sided upper and lower extremity numbness while working in the garden. Denies motor weakness or falls. Also reports dizziness since symptom onset, with a sensation of veering to the left, but no actual falls. Denies chest pain, shortness of breath, fever, or chills. Not on home oxygen, though endorses daily tobacco use. Denies taking any current medications despite a known diagnosis of hypertension.    In the ED, patient vital stable, afebrile, CMP and CBC mostly unremarkable, glucose 111, WBC normal, hemoglobin 18.1, chest x-ray negative for any acute disease.  CTA head and neck showing no evidence of LVO, stenosis or aneurysm, right vertebral is not seen well beyond the V3 segment and may terminate in the PICA, CT brain perfusion showing no significant perfusion regarding the brain, was given aspirin 325 and Plavix 75 once, MRI is pending.  His Case was discussed with teleneuro on-call per ED.  Admitted to medicine team further inpatient management.    Review of Systems as mentioned above    Personal History     Past Medical History:   Diagnosis Date    ADHD (attention deficit hyperactivity disorder)     Hypertension        Past Surgical History:   Procedure Laterality Date    EYE SURGERY      HERNIA REPAIR      SHOULDER ROTATOR CUFF REPAIR Right 2022       Family History: family history includes Diabetes in his father; Heart disease in his  mother; Lung cancer (age of onset: 72) in his father. Otherwise pertinent FHx was reviewed and not pertinent to current issue.    Social History:  reports that he has been smoking cigarettes and cigars. He started smoking about 43 years ago. He has a 43.3 pack-year smoking history. He has been exposed to tobacco smoke. He has never used smokeless tobacco. He reports current alcohol use. He reports current drug use. Drug: Marijuana.    Home Medications:  Prior to Admission Medications       Prescriptions Last Dose Informant Patient Reported? Taking?    amLODIPine (NORVASC) 5 MG tablet   No No    TAKE 1 TABLET BY MOUTH EVERYDAY AT BEDTIME    amphetamine-dextroamphetamine (Adderall) 12.5 MG tablet   No No    Take 1 tablet by mouth 2 (Two) Times a Day.    aspirin 81 MG EC tablet   Yes No    Take 1 tablet by mouth Daily.    ibuprofen (ADVIL,MOTRIN) 200 MG tablet   Yes No    Take 1 tablet by mouth Daily.    sildenafil (Viagra) 100 MG tablet   No No    Take 1 tablet by mouth Daily As Needed for Erectile Dysfunction.    triamterene-hydrochlorothiazide (DYAZIDE) 37.5-25 MG per capsule   No No    Take 1 capsule by mouth Daily.              Allergies:  No Known Allergies    Objective      Vitals:   Temp:  [98.5 °F (36.9 °C)] 98.5 °F (36.9 °C)  Heart Rate:  [] 67  Resp:  [16] 16  BP: (145-154)/() 154/93  Body mass index is 38.3 kg/m².  Physical Exam  General: Awake alert nontoxic, conversational  HEENT: Atraumatic normocephalic  Cardio, heart normal, rhythm regular  Abdominal: Soft, nontender, no rebound or guarding  Respiratory: Clear to auscultation  Extremities: No remarkable edema the bilateral extremities  Neuro: Awake alert oriented, normal speech, 5+ strength in the bilateral upper and lower extremities, gait not evaluated    Diagnostic Data:  Lab Results (last 24 hours)       Procedure Component Value Units Date/Time    Comprehensive Metabolic Panel [[id number]]  (Abnormal) Collected: 04/30/25 0011     Specimen: Blood Updated: 04/30/25 0038     Glucose 111 mg/dL      BUN 14 mg/dL      Creatinine 1.08 mg/dL      Sodium 140 mmol/L      Potassium 4.2 mmol/L      Chloride 104 mmol/L      CO2 27.0 mmol/L      Calcium 9.4 mg/dL      Total Protein 7.1 g/dL      Albumin 4.7 g/dL      ALT (SGPT) 38 U/L      AST (SGOT) 37 U/L      Alkaline Phosphatase 125 U/L      Total Bilirubin 0.4 mg/dL      Globulin 2.4 gm/dL      A/G Ratio 2.0 g/dL      BUN/Creatinine Ratio 13.0     Anion Gap 9.0 mmol/L      eGFR 79.5 mL/min/1.73     Narrative:      GFR Categories in Chronic Kidney Disease (CKD)      GFR Category          GFR (mL/min/1.73)    Interpretation  G1                     90 or greater         Normal or high (1)  G2                      60-89                Mild decrease (1)  G3a                   45-59                Mild to moderate decrease  G3b                   30-44                Moderate to severe decrease  G4                    15-29                Severe decrease  G5                    14 or less           Kidney failure          (1)In the absence of evidence of kidney disease, neither GFR category G1 or G2 fulfill the criteria for CKD.    eGFR calculation 2021 CKD-EPI creatinine equation, which does not include race as a factor    Protime-INR [[id number]]  (Normal) Collected: 04/30/25 0011    Specimen: Blood Updated: 04/30/25 0025     Protime 12.9 Seconds      INR 0.98    aPTT [[id number]]  (Normal) Collected: 04/30/25 0011    Specimen: Blood Updated: 04/30/25 0025     PTT 27.9 seconds     CBC & Differential [[id number]]  (Abnormal) Collected: 04/30/25 0011    Specimen: Blood Updated: 04/30/25 0017    Narrative:      The following orders were created for panel order CBC & Differential.  Procedure                               Abnormality         Status                     ---------                               -----------         ------                     CBC Auto Differential[[id number]]        Abnormal             Final result                 Please view results for these tests on the individual orders.    CBC Auto Differential [[id number]]  (Abnormal) Collected: 04/30/25 0011    Specimen: Blood Updated: 04/30/25 0017     WBC 8.65 10*3/mm3      RBC 6.09 10*6/mm3      Hemoglobin 18.1 g/dL      Hematocrit 53.8 %      MCV 88.3 fL      MCH 29.7 pg      MCHC 33.6 g/dL      RDW 13.3 %      RDW-SD 43.5 fl      MPV 10.9 fL      Platelets 184 10*3/mm3      Neutrophil % 66.7 %      Lymphocyte % 24.5 %      Monocyte % 6.8 %      Eosinophil % 1.5 %      Basophil % 0.2 %      Immature Grans % 0.3 %      Neutrophils, Absolute 5.76 10*3/mm3      Lymphocytes, Absolute 2.12 10*3/mm3      Monocytes, Absolute 0.59 10*3/mm3      Eosinophils, Absolute 0.13 10*3/mm3      Basophils, Absolute 0.02 10*3/mm3      Immature Grans, Absolute 0.03 10*3/mm3      nRBC 0.0 /100 WBC     Eloy Draw [[id number]] Collected: 04/30/25 0011    Specimen: Blood Updated: 04/30/25 0015    Narrative:      The following orders were created for panel order Eloy Draw.  Procedure                               Abnormality         Status                     ---------                               -----------         ------                     Green Top (Gel)[[id number]]                                  Final result               Lavender Top[[id number]]                                     Final result               Gold Top - SST[[id number]]                                   Final result               Light Blue Top[[id number]]                                   Final result                 Please view results for these tests on the individual orders.    Green Top (Gel) [[id number]] Collected: 04/30/25 0011    Specimen: Blood Updated: 04/30/25 0015     Extra Tube Hold for add-ons.     Comment: Auto resulted.       Lavender Top [[id number]] Collected: 04/30/25 0011    Specimen: Blood Updated: 04/30/25 0015     Extra Tube hold for add-on     Comment: Auto resulted       Gold Top -  SST [[id number]] Collected: 04/30/25 0011    Specimen: Blood Updated: 04/30/25 0015     Extra Tube Hold for add-ons.     Comment: Auto resulted.       Light Blue Top [[id number]] Collected: 04/30/25 0011    Specimen: Blood Updated: 04/30/25 0015     Extra Tube Hold for add-ons.     Comment: Auto resulted       POC Glucose Once [[id number]]  (Abnormal) Collected: 04/29/25 2357    Specimen: Blood Updated: 04/29/25 2359     Glucose 118 mg/dL      Comment: Serial Number: [id number]:  [id number]                Imaging Results (Last 24 Hours)       Procedure Component Value Units Date/Time    CT Angiogram Head w AI Analysis of LVO [[id number]] Collected: 04/30/25 0054     Updated: 04/30/25 0101    Narrative:      CT ANGIOGRAM HEAD W AI ANALYSIS OF LVO  CT ANGIOGRAM NECK    Date of Exam: 4/30/2025 12:07 AM EDT    Indication: Stroke, follow up. Neuro deficit, acute, stroke suspected. Acute Stroke.    Comparison: None available.    Technique: CTA of the head was performed after the uneventful intravenous administration of iodinated contrast. Reconstructed coronal and sagittal images were also obtained. In addition, a 3-D volume rendered image was created for interpretation.   Automated exposure control and iterative reconstruction methods were used.      Findings:  Aorta: Aortic arch is normal. There is conventional three-vessel arch anatomy. Right brachiocephalic and bilateral subclavian arteries appear widely patent.    Anterior circulation: Bilateral common carotid arteries, external carotid arteries and distal branches, carotid bifurcations and cervical internal carotid arteries appear within normal limits. Intracranial ICA segments appear widely patent. There are   patent left posterior communicating and anterior communicating arteries. No definite P-comm on the right. The A1 and M1 segments and distal CHUYITA and MCA branches appear patent.    Posterior circulation: There is a dominant left vertebral artery with  diminutive right vertebral artery. Vertebral arteries appear uniform caliber throughout the neck. Left V4 segment is normal. The right vertebral artery is not well seen beyond the V3   segment and may terminate in PICA. Basilar artery is normal. Superior cerebellar arteries appear patent although not well seen on the left. Bilateral P1 segments and distal PCA branches appear normal.    Nonvascular findings: No acute intracranial abnormalities. 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 Left orbit is normal. Paranasal sinuses and mastoid air cells appear well aerated. Thyroid is homogeneous. No adenopathy or neck mass. Salivary glands   appear symmetric. Upper lungs are clear.      Impression:      Impression:  1.No evidence of large vessel occlusion, significant stenosis or aneurysm.  2.Dominant left vertebral artery with diminutive right vertebral artery. The right vertebral artery is not well seen beyond the V3 segment and may terminate in PICA.            Electronically Signed: Kishor Bess MD    4/30/2025 12:59 AM EDT    Workstation ID: [id number]    CT Angiogram Neck [[id number]] Collected: 04/30/25 0054     Updated: 04/30/25 0101    Narrative:      CT ANGIOGRAM HEAD W AI ANALYSIS OF LVO  CT ANGIOGRAM NECK    Date of Exam: 4/30/2025 12:07 AM EDT    Indication: Stroke, follow up. Neuro deficit, acute, stroke suspected. Acute Stroke.    Comparison: None available.    Technique: CTA of the head was performed after the uneventful intravenous administration of iodinated contrast. Reconstructed coronal and sagittal images were also obtained. In addition, a 3-D volume rendered image was created for interpretation.   Automated exposure control and iterative reconstruction methods were used.      Findings:  Aorta: Aortic arch is normal. There is conventional three-vessel arch anatomy. Right brachiocephalic and bilateral subclavian arteries appear widely patent.    Anterior circulation: Bilateral common carotid arteries,  external carotid arteries and distal branches, carotid bifurcations and cervical internal carotid arteries appear within normal limits. Intracranial ICA segments appear widely patent. There are   patent left posterior communicating and anterior communicating arteries. No definite P-comm on the right. The A1 and M1 segments and distal CHUYITA and MCA branches appear patent.    Posterior circulation: There is a dominant left vertebral artery with diminutive right vertebral artery. Vertebral arteries appear uniform caliber throughout the neck. Left V4 segment is normal. The right vertebral artery is not well seen beyond the V3   segment and may terminate in PICA. Basilar artery is normal. Superior cerebellar arteries appear patent although not well seen on the left. Bilateral P1 segments and distal PCA branches appear normal.    Nonvascular findings: No acute intracranial abnormalities. 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 Left orbit is normal. Paranasal sinuses and mastoid air cells appear well aerated. Thyroid is homogeneous. No adenopathy or neck mass. Salivary glands   appear symmetric. Upper lungs are clear.      Impression:      Impression:  1.No evidence of large vessel occlusion, significant stenosis or aneurysm.  2.Dominant left vertebral artery with diminutive right vertebral artery. The right vertebral artery is not well seen beyond the V3 segment and may terminate in PICA.            Electronically Signed: Kishor Bess MD    4/30/2025 12:59 AM EDT    Workstation ID: [id number]    XR Chest 1 View [[id number]] Collected: 04/30/25 0054     Updated: 04/30/25 0057    Narrative:      XR CHEST 1 VW    Date of Exam: 4/30/2025 12:28 AM EDT    Indication: Acute Stroke Protocol (onset < 12 hrs)    Comparison: 1/27/2022    Findings:  Cardiac and mediastinal contours are normal. Pulmonary vascularity is normal. The lungs are clear. No pneumothorax.      Impression:      No active disease.        Electronically Signed:  Cole Cornejo MD    4/30/2025 12:55 AM EDT    Workstation ID: [id number]    CT CEREBRAL PERFUSION WITH & WITHOUT CONTRAST [[id number]] Collected: 04/30/25 0045     Updated: 04/30/25 0048    Narrative:      CT CEREBRAL PERFUSION W WO CONTRAST    Date of Exam: 4/30/2025 12:07 AM EDT    Indication: Neuro deficit, acute, stroke suspected.     Comparison: None available.    Technique: Axial CT images of the brain were obtained prior to and after the administration of iodinated contrast. CT Perfusion protocol was utilized. Automated post processing was performed by RAPID software and submitted to PACS for interpretation.   Automated exposure control and iterative reconstruction was utilized.      Findings:  Cerebral blood flow, blood volume and mean transit time maps are symmetric without large perfusion defect.    CBF<30% volume: 0 mL  Tmax>6sec volume: 0 mL  Mismatch volume: 0 mL  Mismatch ratio: None.      Impression:      Impression:  No significant perfusion abnormality in the brain.            Electronically Signed: Kishor Bess MD    4/30/2025 12:46 AM EDT    Workstation ID: [id number]    CT Head Without Contrast Stroke Protocol [[id number]] Collected: 04/30/25 0009     Updated: 04/30/25 0014    Narrative:      CT HEAD WO CONTRAST STROKE PROTOCOL    Date of Exam: 4/30/2025 12:05 AM EDT    Indication: Neuro deficit, acute, stroke suspected.    Comparison: None available.    Technique: Axial CT images were obtained of the head without contrast administration.  Reconstructed coronal and sagittal images were also obtained. Automated exposure control and iterative construction methods were used.    Scan Time: 0009 hours.  Results discussed with Dr. Kim at 0011 hours.      Findings:  Superficial soft tissues appear within normal limits. The calvarium is intact.  Paranasal sinuses and mastoid air cells appear well aerated. Left orbit is normal. 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 There is no acute intracranial  hemorrhage.  No   mass effect or midline shift.  No abnormal extra-axial collections.  Gray-white differentiation is within normal limits.  There are no focal hypoattenuating lesions.  Ventricular size and configuration is normal for age.      Impression:      Impression:  1.No acute intracranial abnormality.  2.Right orbit prosthesis.            Electronically Signed: Kishor Bess MD    4/30/2025 12:12 AM EDT    Workstation ID: [id number]              Assessment & Plan    Kendell Mccall is a 59 y.o. male with a CMH of lifelong smoker, hypertension, hyperlipidemia, nonmedication compliance, who presented to [facility] on 4/29/2025 with  left-sided numbness.     Assessments  Left upper and lower extremity numbness, rule out stroke  Hypertension  Hyperlipidemia  Non compliance with medications  Lifelong non-smoker  Polycythemia, likely due to smoking    Plan:   - CTA head and neck showing no evidence of LVO, stenosis or aneurysm, right vertebral is not seen well beyond the V3 segment and may terminate in the PICA, CT brain perfusion showing no significant perfusion regarding the brain  -MRI brain: No acute intracranial abnormality.   -given aspirin 325 and Plavix 75 once, MRI is pending.    -Check A1c, LDL, defer echo to neurology if needed given MRI neg, permissive hypertension for now  - Neurology consulted, will follow further recommendations  - Counseled patient about the compliance with his medication  - Follow a.m. labs    VTE Prophylaxis:  No VTE prophylaxis order currently exists.      CODE STATUS:  Full      I discussed the patient's findings and my recommendations with patient.      This document has been electronically signed by Zaid Cornejo MD on April 30, 2025 01:19 EDT   [facility] Hospitalist Team

## 2025-04-30 NOTE — PLAN OF CARE
Problem: Adult Inpatient Plan of Care  Goal: Plan of Care Review  4/30/2025 1336 by Janina Ayon RN  Outcome: Progressing  Flowsheets (Taken 4/30/2025 1336)  Progress: no change  Plan of Care Reviewed With: patient  4/30/2025 0722 by Janina Ayon RN  Outcome: Progressing  Flowsheets (Taken 4/30/2025 0722)  Progress: no change  Plan of Care Reviewed With: patient  Goal: Patient-Specific Goal (Individualized)  4/30/2025 1336 by Janina Ayon RN  Outcome: Progressing  4/30/2025 0722 by Janina Ayon RN  Outcome: Progressing  Goal: Absence of Hospital-Acquired Illness or Injury  4/30/2025 1336 by Janina Ayon RN  Outcome: Progressing  4/30/2025 0722 by Janina Ayon RN  Outcome: Progressing  Intervention: Identify and Manage Fall Risk  Recent Flowsheet Documentation  Taken 4/30/2025 1118 by Janina Ayon RN  Safety Promotion/Fall Prevention:   assistive device/personal items within reach   clutter free environment maintained   fall prevention program maintained   nonskid shoes/slippers when out of bed   safety round/check completed   room organization consistent  Taken 4/30/2025 1022 by Janina Ayon RN  Safety Promotion/Fall Prevention: patient off unit  Taken 4/30/2025 0850 by Janina Ayon RN  Safety Promotion/Fall Prevention:   assistive device/personal items within reach   clutter free environment maintained   fall prevention program maintained   nonskid shoes/slippers when out of bed   safety round/check completed   room organization consistent  Intervention: Prevent Skin Injury  Recent Flowsheet Documentation  Taken 4/30/2025 1118 by Janina Ayon RN  Body Position: position changed independently  Skin Protection: transparent dressing maintained  Taken 4/30/2025 0850 by Janina Ayon RN  Body Position: position changed independently  Skin Protection: transparent dressing maintained  Intervention: Prevent and Manage VTE (Venous Thromboembolism) Risk  Recent Flowsheet Documentation  Taken 4/30/2025  1118 by Janina Ayon RN  VTE Prevention/Management:   bilateral   SCDs (sequential compression devices) off   patient refused intervention  Taken 4/30/2025 0850 by Janina Ayon RN  VTE Prevention/Management:   bilateral   SCDs (sequential compression devices) off   patient refused intervention  Intervention: Prevent Infection  Recent Flowsheet Documentation  Taken 4/30/2025 1118 by Janina Ayon RN  Infection Prevention:   environmental surveillance performed   hand hygiene promoted   rest/sleep promoted   single patient room provided  Taken 4/30/2025 0850 by Janina Ayon RN  Infection Prevention:   environmental surveillance performed   hand hygiene promoted   rest/sleep promoted   single patient room provided  Goal: Optimal Comfort and Wellbeing  4/30/2025 1336 by Janina Ayon RN  Outcome: Progressing  4/30/2025 0722 by Janina Ayon RN  Outcome: Progressing  Intervention: Provide Person-Centered Care  Recent Flowsheet Documentation  Taken 4/30/2025 1118 by Janina Ayon RN  Trust Relationship/Rapport:   care explained   thoughts/feelings acknowledged  Taken 4/30/2025 0850 by Janina Ayon RN  Trust Relationship/Rapport:   care explained   thoughts/feelings acknowledged  Goal: Readiness for Transition of Care  4/30/2025 1336 by Janina Ayon RN  Outcome: Progressing  4/30/2025 0722 by Janina Ayon RN  Outcome: Progressing     Problem: Sensory Impairment  Goal: Optimal Sensory Management  4/30/2025 1336 by Janina Ayon RN  Outcome: Progressing  4/30/2025 0722 by Janina Ayon RN  Outcome: Progressing   Goal Outcome Evaluation:  Plan of Care Reviewed With: patient        Progress: no change

## 2025-04-30 NOTE — DISCHARGE INSTR - OTHER ORDERS
[facility]    [address], [place] [address] [phone]

## 2025-04-30 NOTE — SIGNIFICANT NOTE
04/30/25 1359   OTHER   Discipline physical therapist   Rehab Time/Intention   Session Not Performed patient unavailable for evaluation  (Checked on pt this AM and pt was off floor at ECHO.)   Therapy Assessment/Plan (PT)   Criteria for Skilled Interventions Met (PT) yes;skilled treatment is necessary   Recommendation   PT - Next Appointment 04/30/25

## 2025-04-30 NOTE — ED PROVIDER NOTES
"Subjective   History of Present Illness  Chief complaint: Left-sided numbness    59-year-old male presents with left-sided numbness.  Symptoms started at 7:30 PM today.  He reports numbness and tingling to the left arm and left leg.  He also noticed that he was kind of stumbling to the left and his left leg was trying to give out on him as he walked.  He denies any speech disturbance or facial droop.  He denies headache.    History provided by:  Patient      Review of Systems   Constitutional:  Negative for fever.   HENT:  Negative for congestion.    Respiratory:  Negative for cough and shortness of breath.    Cardiovascular:  Negative for chest pain.   Gastrointestinal:  Negative for abdominal pain and vomiting.   Musculoskeletal:  Negative for back pain.   Neurological:  Positive for weakness and numbness. Negative for headaches.   Psychiatric/Behavioral:  Negative for confusion.        Past Medical History:   Diagnosis Date    ADHD (attention deficit hyperactivity disorder)     Hypertension        No Known Allergies    Past Surgical History:   Procedure Laterality Date    EYE SURGERY      HERNIA REPAIR      SHOULDER ROTATOR CUFF REPAIR Right 02/04/2022       Family History   Problem Relation Age of Onset    Heart disease Mother     Diabetes Father     Lung cancer Father 72        Associated to cigarette smoking       Social History     Socioeconomic History    Marital status: Single   Tobacco Use    Smoking status: Every Day     Current packs/day: 1.00     Average packs/day: 1 pack/day for 43.3 years (43.3 ttl pk-yrs)     Types: Cigarettes, Cigars     Start date: 1982     Passive exposure: Current    Smokeless tobacco: Never   Vaping Use    Vaping status: Never Used   Substance and Sexual Activity    Alcohol use: Yes     Comment: rarely    Drug use: Yes     Types: Marijuana    Sexual activity: Defer       /93   Pulse 67   Temp 98.5 °F (36.9 °C) (Oral)   Resp 16   Ht 182.9 cm (72\")   Wt 128 kg (282 lb " 6.6 oz)   SpO2 93%   BMI 38.30 kg/m²       Objective   Physical Exam  Vitals and nursing note reviewed.   Constitutional:       Appearance: Normal appearance.   HENT:      Head: Normocephalic and atraumatic.      Mouth/Throat:      Mouth: Mucous membranes are moist.   Cardiovascular:      Rate and Rhythm: Normal rate and regular rhythm.      Heart sounds: Normal heart sounds.   Pulmonary:      Effort: Pulmonary effort is normal. No respiratory distress.      Breath sounds: Normal breath sounds.   Abdominal:      Palpations: Abdomen is soft.      Tenderness: There is no abdominal tenderness.   Skin:     General: Skin is warm and dry.   Neurological:      Mental Status: He is alert and oriented to person, place, and time.      Comments: No focal motor deficit appreciated.  There is subjective decreased sensation to the left arm and left leg as well as left side of the face.  There is no facial asymmetry.  There is no dysarthria or aphasia.         Procedures           ED Course      Results for orders placed or performed during the hospital encounter of 04/29/25   POC Glucose Once    Collection Time: 04/29/25 11:57 PM    Specimen: Blood   Result Value Ref Range    Glucose 118 (H) 70 - 105 mg/dL   Comprehensive Metabolic Panel    Collection Time: 04/30/25 12:11 AM    Specimen: Blood   Result Value Ref Range    Glucose 111 (H) 65 - 99 mg/dL    BUN 14 6 - 20 mg/dL    Creatinine 1.08 0.76 - 1.27 mg/dL    Sodium 140 136 - 145 mmol/L    Potassium 4.2 3.5 - 5.2 mmol/L    Chloride 104 98 - 107 mmol/L    CO2 27.0 22.0 - 29.0 mmol/L    Calcium 9.4 8.6 - 10.5 mg/dL    Total Protein 7.1 6.0 - 8.5 g/dL    Albumin 4.7 3.5 - 5.2 g/dL    ALT (SGPT) 38 1 - 41 U/L    AST (SGOT) 37 1 - 40 U/L    Alkaline Phosphatase 125 (H) 39 - 117 U/L    Total Bilirubin 0.4 0.0 - 1.2 mg/dL    Globulin 2.4 gm/dL    A/G Ratio 2.0 g/dL    BUN/Creatinine Ratio 13.0 7.0 - 25.0    Anion Gap 9.0 5.0 - 15.0 mmol/L    eGFR 79.5 >60.0 mL/min/1.73    Protime-INR    Collection Time: 04/30/25 12:11 AM    Specimen: Blood   Result Value Ref Range    Protime 12.9 11.7 - 14.2 Seconds    INR 0.98 0.90 - 1.10   aPTT    Collection Time: 04/30/25 12:11 AM    Specimen: Blood   Result Value Ref Range    PTT 27.9 22.7 - 35.4 seconds   CBC Auto Differential    Collection Time: 04/30/25 12:11 AM    Specimen: Blood   Result Value Ref Range    WBC 8.65 3.40 - 10.80 10*3/mm3    RBC 6.09 (H) 4.14 - 5.80 10*6/mm3    Hemoglobin 18.1 (H) 13.0 - 17.7 g/dL    Hematocrit 53.8 (H) 37.5 - 51.0 %    MCV 88.3 79.0 - 97.0 fL    MCH 29.7 26.6 - 33.0 pg    MCHC 33.6 31.5 - 35.7 g/dL    RDW 13.3 12.3 - 15.4 %    RDW-SD 43.5 37.0 - 54.0 fl    MPV 10.9 6.0 - 12.0 fL    Platelets 184 140 - 450 10*3/mm3    Neutrophil % 66.7 42.7 - 76.0 %    Lymphocyte % 24.5 19.6 - 45.3 %    Monocyte % 6.8 5.0 - 12.0 %    Eosinophil % 1.5 0.3 - 6.2 %    Basophil % 0.2 0.0 - 1.5 %    Immature Grans % 0.3 0.0 - 0.5 %    Neutrophils, Absolute 5.76 1.70 - 7.00 10*3/mm3    Lymphocytes, Absolute 2.12 0.70 - 3.10 10*3/mm3    Monocytes, Absolute 0.59 0.10 - 0.90 10*3/mm3    Eosinophils, Absolute 0.13 0.00 - 0.40 10*3/mm3    Basophils, Absolute 0.02 0.00 - 0.20 10*3/mm3    Immature Grans, Absolute 0.03 0.00 - 0.05 10*3/mm3    nRBC 0.0 0.0 - 0.2 /100 WBC   Type & Screen    Collection Time: 04/30/25 12:11 AM    Specimen: Blood   Result Value Ref Range    ABO Type B     RH type Positive    Green Top (Gel)    Collection Time: 04/30/25 12:11 AM   Result Value Ref Range    Extra Tube Hold for add-ons.    Lavender Top    Collection Time: 04/30/25 12:11 AM   Result Value Ref Range    Extra Tube hold for add-on    Gold Top - SST    Collection Time: 04/30/25 12:11 AM   Result Value Ref Range    Extra Tube Hold for add-ons.    Light Blue Top    Collection Time: 04/30/25 12:11 AM   Result Value Ref Range    Extra Tube Hold for add-ons.    ECG 12 Lead Stroke Evaluation    Collection Time: 04/30/25 12:27 AM   Result Value Ref Range     QT Interval 378 ms    QTC Interval 416 ms     CT Angiogram Head w AI Analysis of LVO  Result Date: 4/30/2025  Impression: 1.No evidence of large vessel occlusion, significant stenosis or aneurysm. 2.Dominant left vertebral artery with diminutive right vertebral artery. The right vertebral artery is not well seen beyond the V3 segment and may terminate in PICA. Electronically Signed: Kishor Bess MD  4/30/2025 12:59 AM EDT  Workstation ID: [id number]    CT Angiogram Neck  Result Date: 4/30/2025  Impression: 1.No evidence of large vessel occlusion, significant stenosis or aneurysm. 2.Dominant left vertebral artery with diminutive right vertebral artery. The right vertebral artery is not well seen beyond the V3 segment and may terminate in PICA. Electronically Signed: Kishor Bess MD  4/30/2025 12:59 AM EDT  Workstation ID: [id number]    XR Chest 1 View  Result Date: 4/30/2025  No active disease. Electronically Signed: Cole Cornejo MD  4/30/2025 12:55 AM EDT  Workstation ID: [id number]    CT CEREBRAL PERFUSION WITH & WITHOUT CONTRAST  Result Date: 4/30/2025  Impression: No significant perfusion abnormality in the brain. Electronically Signed: Kishor Bess MD  4/30/2025 12:46 AM EDT  Workstation ID: [id number]    CT Head Without Contrast Stroke Protocol  Result Date: 4/30/2025  Impression: 1.No acute intracranial abnormality. 2.Right orbit prosthesis. Electronically Signed: Kishor Bess MD  4/30/2025 12:12 AM EDT  Workstation ID: [id number]      My interpretation of EKG shows sinus rhythm, rate of 73, left anterior fascicular block, no ST elevation                        Total (NIH Stroke Scale): 1                      Medical Decision Making  Amount and/or Complexity of Data Reviewed  Labs: ordered.  Radiology: ordered.  ECG/medicine tests: ordered.    Risk  OTC drugs.  Prescription drug management.      Patient had the above evaluation.  Results were discussed with the patient.  Code stroke was initiated on patient  arrival.  Patient is not a TNK candidate as he is outside the time window.  CT head shows no acute intracranial abnormality.  CT perfusion showed no acute abnormality.  My interpretation of chest x-ray shows no infiltrate or effusion.  EKG shows no acute ischemia.  White blood cell count is normal.  CMP is unremarkable.  CTA head and neck is pending.  I discussed with Dr. Miles with neurology who recommends giving the patient aspirin and Plavix and obtaining MRI of the brain.  This has been ordered.  I discussed with the hospitalist and the patient will be admitted for further evaluation and management.      Final diagnoses:   Left sided numbness       ED Disposition  ED Disposition       ED Disposition   Decision to Admit    Condition   --    Comment   Level of Care: Telemetry [5]   Admitting Physician: NASREEN SAMUELS [[id number]]   Attending Physician: NASREEN SAMUELS [[id number]]   Bed Request Comments: CONSTANCE                 No follow-up provider specified.       Medication List      No changes were made to your prescriptions during this visit.            Toby Kim MD  04/30/25 0103

## 2025-04-30 NOTE — SIGNIFICANT NOTE
04/30/25 1503   OTHER   Discipline physical therapist   Rehab Time/Intention   Session Not Performed patient/family declined evaluation;other (see comments)  (RN reports pt is leaving and is refusing PT eval. D/c orders are in.)   Recommendation   PT - Next Appointment 05/01/25

## 2025-04-30 NOTE — PROGRESS NOTES
Same-day nonbillable progress note:    Patient presented to the hospital complaints of left-sided numbness.  He states that he still has some paresthesias of the left arm.  He does have some neck pain as well associated with this.  The patient had negative MRI of the brain and therefore no evidence of stroke or any other acute pathology.  CT angiogram of the head and neck also showed no significant occlusion.  He has been initiated on antiplatelet therapy and I would likely continue him on low-dose aspirin and statin therapy on discharge as this may have been a TIA.  However I will also check MRI of the cervical spine to assess for possible cervical spine pathology that is causing his paresthesias.      Андрей Garcia MD  [facility] Hospitalist Team  04/30/25  13:15 EDT

## 2025-05-01 NOTE — DISCHARGE SUMMARY
Patient left AMA after being seen by me.  He left on the same day as he was admitted.  Please see H&P for details.